# Patient Record
Sex: FEMALE | Race: WHITE | NOT HISPANIC OR LATINO | Employment: PART TIME | URBAN - METROPOLITAN AREA
[De-identification: names, ages, dates, MRNs, and addresses within clinical notes are randomized per-mention and may not be internally consistent; named-entity substitution may affect disease eponyms.]

---

## 2017-01-27 ENCOUNTER — GENERIC CONVERSION - ENCOUNTER (OUTPATIENT)
Dept: OTHER | Facility: OTHER | Age: 27
End: 2017-01-27

## 2017-03-09 ENCOUNTER — TRANSCRIBE ORDERS (OUTPATIENT)
Dept: LAB | Facility: CLINIC | Age: 27
End: 2017-03-09

## 2017-03-09 ENCOUNTER — LAB (OUTPATIENT)
Dept: LAB | Facility: CLINIC | Age: 27
End: 2017-03-09
Payer: COMMERCIAL

## 2017-03-09 DIAGNOSIS — G40.219 SEIZURE DISORDER, TEMPORAL LOBE, INTRACTABLE (HCC): ICD-10-CM

## 2017-03-09 DIAGNOSIS — G40.219 SEIZURE DISORDER, TEMPORAL LOBE, INTRACTABLE (HCC): Primary | ICD-10-CM

## 2017-03-09 PROCEDURE — 80175 DRUG SCREEN QUAN LAMOTRIGINE: CPT

## 2017-03-09 PROCEDURE — 36415 COLL VENOUS BLD VENIPUNCTURE: CPT

## 2017-03-09 PROCEDURE — 80203 DRUG SCREEN QUANT ZONISAMIDE: CPT

## 2017-03-12 LAB — LAMOTRIGINE SERPL-MCNC: 6.5 UG/ML (ref 2–20)

## 2017-03-13 LAB — ZONISAMIDE SERPL-MCNC: 6.3 UG/ML (ref 10–40)

## 2017-06-23 ENCOUNTER — ALLSCRIPTS OFFICE VISIT (OUTPATIENT)
Dept: OTHER | Facility: OTHER | Age: 27
End: 2017-06-23

## 2017-06-23 DIAGNOSIS — J02.9 ACUTE PHARYNGITIS: ICD-10-CM

## 2017-06-23 DIAGNOSIS — K13.79 OTHER LESIONS OF ORAL MUCOSA: ICD-10-CM

## 2017-06-23 DIAGNOSIS — J06.9 ACUTE UPPER RESPIRATORY INFECTION: ICD-10-CM

## 2017-06-23 LAB — S PYO AG THROAT QL: NEGATIVE

## 2017-06-24 ENCOUNTER — LAB CONVERSION - ENCOUNTER (OUTPATIENT)
Dept: OTHER | Facility: OTHER | Age: 27
End: 2017-06-24

## 2017-06-24 LAB — QUESTION/PROBLEM (HISTORICAL): NORMAL

## 2017-06-26 ENCOUNTER — GENERIC CONVERSION - ENCOUNTER (OUTPATIENT)
Dept: OTHER | Facility: OTHER | Age: 27
End: 2017-06-26

## 2017-06-28 ENCOUNTER — LAB CONVERSION - ENCOUNTER (OUTPATIENT)
Dept: OTHER | Facility: OTHER | Age: 27
End: 2017-06-28

## 2017-06-28 LAB
CONTACT: (HISTORICAL): NORMAL
CULTURE RESULT (HISTORICAL): NORMAL
TEST INFORMATION (HISTORICAL): NORMAL
TEST NAME (HISTORICAL): NORMAL

## 2017-06-29 ENCOUNTER — GENERIC CONVERSION - ENCOUNTER (OUTPATIENT)
Dept: OTHER | Facility: OTHER | Age: 27
End: 2017-06-29

## 2017-08-16 ENCOUNTER — GENERIC CONVERSION - ENCOUNTER (OUTPATIENT)
Dept: OTHER | Facility: OTHER | Age: 27
End: 2017-08-16

## 2017-09-12 ENCOUNTER — APPOINTMENT (EMERGENCY)
Dept: RADIOLOGY | Facility: HOSPITAL | Age: 27
End: 2017-09-12
Payer: COMMERCIAL

## 2017-09-12 ENCOUNTER — LAB (OUTPATIENT)
Dept: LAB | Facility: CLINIC | Age: 27
End: 2017-09-12
Payer: COMMERCIAL

## 2017-09-12 ENCOUNTER — TRANSCRIBE ORDERS (OUTPATIENT)
Dept: LAB | Facility: CLINIC | Age: 27
End: 2017-09-12

## 2017-09-12 ENCOUNTER — ALLSCRIPTS OFFICE VISIT (OUTPATIENT)
Dept: OTHER | Facility: OTHER | Age: 27
End: 2017-09-12

## 2017-09-12 ENCOUNTER — HOSPITAL ENCOUNTER (EMERGENCY)
Facility: HOSPITAL | Age: 27
Discharge: HOME | End: 2017-09-12
Attending: EMERGENCY MEDICINE
Payer: COMMERCIAL

## 2017-09-12 VITALS
RESPIRATION RATE: 18 BRPM | DIASTOLIC BLOOD PRESSURE: 62 MMHG | TEMPERATURE: 99.9 F | SYSTOLIC BLOOD PRESSURE: 118 MMHG | HEIGHT: 63 IN | HEART RATE: 90 BPM | BODY MASS INDEX: 24.8 KG/M2 | OXYGEN SATURATION: 100 % | WEIGHT: 140 LBS

## 2017-09-12 DIAGNOSIS — Z98.890 HISTORY OF BRAIN SURGERY: ICD-10-CM

## 2017-09-12 DIAGNOSIS — G40.919 BREAKTHROUGH SEIZURE (HCC): Primary | ICD-10-CM

## 2017-09-12 DIAGNOSIS — G40.209 CRYPTOGENIC PARTIAL COMPLEX EPILEPSY (HCC): Primary | ICD-10-CM

## 2017-09-12 DIAGNOSIS — R19.7 DIARRHEA: ICD-10-CM

## 2017-09-12 DIAGNOSIS — K13.79 OTHER LESIONS OF ORAL MUCOSA: ICD-10-CM

## 2017-09-12 DIAGNOSIS — E61.1 IRON DEFICIENCY: ICD-10-CM

## 2017-09-12 LAB
ALBUMIN SERPL BCP-MCNC: 2.7 G/DL (ref 3.5–5)
ALP SERPL-CCNC: 82 U/L (ref 46–116)
ALT SERPL W P-5'-P-CCNC: 12 U/L (ref 12–78)
ANION GAP SERPL CALCULATED.3IONS-SCNC: 6 MMOL/L (ref 4–13)
AST SERPL W P-5'-P-CCNC: 7 U/L (ref 5–45)
ATRIAL RATE: 93 BPM
BACTERIA UR QL AUTO: ABNORMAL /HPF
BILIRUB SERPL-MCNC: 0.13 MG/DL (ref 0.2–1)
BILIRUB UR QL STRIP: NEGATIVE
BUN SERPL-MCNC: 17 MG/DL (ref 5–25)
CALCIUM SERPL-MCNC: 8.4 MG/DL (ref 8.3–10.1)
CHLORIDE SERPL-SCNC: 105 MMOL/L (ref 100–108)
CLARITY UR: CLEAR
CO2 SERPL-SCNC: 24 MMOL/L (ref 21–32)
COLOR UR: YELLOW
COLOR, POC: NORMAL
CREAT SERPL-MCNC: 0.88 MG/DL (ref 0.6–1.3)
ERYTHROCYTE [DISTWIDTH] IN BLOOD BY AUTOMATED COUNT: 15.2 % (ref 11.6–15.1)
EXT PREG TEST URINE: NEGATIVE
GFR SERPL CREATININE-BSD FRML MDRD: 92 ML/MIN/1.73SQ M
GLUCOSE P FAST SERPL-MCNC: 88 MG/DL (ref 65–99)
GLUCOSE UR STRIP-MCNC: NEGATIVE MG/DL
HCT VFR BLD AUTO: 36.5 % (ref 34.8–46.1)
HGB BLD-MCNC: 11.2 G/DL (ref 11.5–15.4)
HGB UR QL STRIP.AUTO: ABNORMAL
HYALINE CASTS #/AREA URNS LPF: ABNORMAL /LPF
IRON SERPL-MCNC: 13 UG/DL (ref 50–170)
KETONES UR STRIP-MCNC: NEGATIVE MG/DL
LEUKOCYTE ESTERASE UR QL STRIP: NEGATIVE
MCH RBC QN AUTO: 24.8 PG (ref 26.8–34.3)
MCHC RBC AUTO-ENTMCNC: 30.7 G/DL (ref 31.4–37.4)
MCV RBC AUTO: 81 FL (ref 82–98)
NITRITE UR QL STRIP: NEGATIVE
NON-SQ EPI CELLS URNS QL MICRO: ABNORMAL /HPF
P AXIS: 72 DEGREES
PH UR STRIP.AUTO: 5.5 [PH] (ref 4.5–8)
PLATELET # BLD AUTO: 309 THOUSANDS/UL (ref 149–390)
PMV BLD AUTO: 8.1 FL (ref 8.9–12.7)
POTASSIUM SERPL-SCNC: 3.9 MMOL/L (ref 3.5–5.3)
PR INTERVAL: 154 MS
PROT SERPL-MCNC: 7.1 G/DL (ref 6.4–8.2)
PROT UR STRIP-MCNC: ABNORMAL MG/DL
QRS AXIS: 63 DEGREES
QRSD INTERVAL: 88 MS
QT INTERVAL: 332 MS
QTC INTERVAL: 412 MS
RBC # BLD AUTO: 4.52 MILLION/UL (ref 3.81–5.12)
RBC #/AREA URNS AUTO: ABNORMAL /HPF
SODIUM SERPL-SCNC: 135 MMOL/L (ref 136–145)
SP GR UR STRIP.AUTO: 1.02 (ref 1–1.03)
T WAVE AXIS: 44 DEGREES
TSH SERPL DL<=0.05 MIU/L-ACNC: 1.41 UIU/ML (ref 0.36–3.74)
UROBILINOGEN UR QL STRIP.AUTO: 0.2 E.U./DL
VENTRICULAR RATE: 93 BPM
WBC # BLD AUTO: 9.67 THOUSAND/UL (ref 4.31–10.16)
WBC #/AREA URNS AUTO: ABNORMAL /HPF

## 2017-09-12 PROCEDURE — 82784 ASSAY IGA/IGD/IGG/IGM EACH: CPT

## 2017-09-12 PROCEDURE — 71020 HB CHEST X-RAY 2VW FRONTAL&LATL: CPT

## 2017-09-12 PROCEDURE — 85027 COMPLETE CBC AUTOMATED: CPT

## 2017-09-12 PROCEDURE — 86003 ALLG SPEC IGE CRUDE XTRC EA: CPT

## 2017-09-12 PROCEDURE — 81001 URINALYSIS AUTO W/SCOPE: CPT

## 2017-09-12 PROCEDURE — 93005 ELECTROCARDIOGRAM TRACING: CPT

## 2017-09-12 PROCEDURE — 80203 DRUG SCREEN QUANT ZONISAMIDE: CPT

## 2017-09-12 PROCEDURE — 80175 DRUG SCREEN QUAN LAMOTRIGINE: CPT

## 2017-09-12 PROCEDURE — 99285 EMERGENCY DEPT VISIT HI MDM: CPT

## 2017-09-12 PROCEDURE — 86255 FLUORESCENT ANTIBODY SCREEN: CPT

## 2017-09-12 PROCEDURE — 83516 IMMUNOASSAY NONANTIBODY: CPT

## 2017-09-12 PROCEDURE — 81025 URINE PREGNANCY TEST: CPT | Performed by: EMERGENCY MEDICINE

## 2017-09-12 PROCEDURE — 70450 CT HEAD/BRAIN W/O DYE: CPT

## 2017-09-12 PROCEDURE — 82785 ASSAY OF IGE: CPT

## 2017-09-12 PROCEDURE — 84443 ASSAY THYROID STIM HORMONE: CPT

## 2017-09-12 PROCEDURE — 80053 COMPREHEN METABOLIC PANEL: CPT

## 2017-09-12 PROCEDURE — 83540 ASSAY OF IRON: CPT

## 2017-09-12 PROCEDURE — 81002 URINALYSIS NONAUTO W/O SCOPE: CPT | Performed by: EMERGENCY MEDICINE

## 2017-09-12 PROCEDURE — 36415 COLL VENOUS BLD VENIPUNCTURE: CPT

## 2017-09-12 PROCEDURE — 87086 URINE CULTURE/COLONY COUNT: CPT

## 2017-09-12 RX ORDER — LEVOCETIRIZINE DIHYDROCHLORIDE 5 MG/1
5 TABLET, FILM COATED ORAL EVERY EVENING
Status: ON HOLD | COMMUNITY
End: 2018-10-10

## 2017-09-12 RX ORDER — ZONISAMIDE 100 MG/1
50 CAPSULE ORAL 2 TIMES DAILY
Status: ON HOLD | COMMUNITY
End: 2018-10-10

## 2017-09-12 RX ORDER — LAMOTRIGINE 200 MG/1
100 TABLET ORAL 2 TIMES DAILY
COMMUNITY

## 2017-09-13 ENCOUNTER — GENERIC CONVERSION - ENCOUNTER (OUTPATIENT)
Dept: OTHER | Facility: OTHER | Age: 27
End: 2017-09-13

## 2017-09-13 LAB
A-LACTALB IGE QN: <0.1 KAU/I
ALLERGEN COMMENT: ABNORMAL
ALMOND IGE QN: <0.1 KUA/I
B-LACTOGLOB IGE QN: <0.1 KAU/I
CASEIN IGE QN: <0.1 KAU/I
CASHEW NUT IGE QN: <0.1 KUA/I
CODFISH IGE QN: <0.1 KUA/I
EGG WHITE IGE QN: <0.1 KUA/I
ENDOMYSIUM IGA SER QL: NEGATIVE
GLIADIN PEPTIDE IGA SER-ACNC: 6 UNITS (ref 0–19)
GLIADIN PEPTIDE IGG SER-ACNC: 2 UNITS (ref 0–19)
GLUTEN IGE QN: 0.22 KUA/I
HAZELNUT IGE QN: <0.1 KUA/L
IGA SERPL-MCNC: 184 MG/DL (ref 87–352)
MILK IGE QN: 0.12 KUA/I
PEANUT IGE QN: <0.1 KUA/I
SALMON IGE QN: <0.1 KUA/I
SCALLOP IGE QN: 0.37 KUA/L
SESAME SEED IGE QN: 0.41 KUA/I
SHRIMP IGE QN: <0.1 KUA/L
SOYBEAN IGE QN: <0.1 KUA/I
TOTAL IGE SMQN RAST: 601 KU/L (ref 0–113)
TTG IGA SER-ACNC: <2 U/ML (ref 0–3)
TTG IGG SER-ACNC: 2 U/ML (ref 0–5)
TUNA IGE QN: <0.1 KUA/I
WALNUT IGE QN: <0.1 KUA/I
WHEAT IGE QN: 1.06 KUA/I

## 2017-09-14 ENCOUNTER — GENERIC CONVERSION - ENCOUNTER (OUTPATIENT)
Dept: OTHER | Facility: OTHER | Age: 27
End: 2017-09-14

## 2017-09-14 LAB
BACTERIA UR CULT: NORMAL
LAMOTRIGINE SERPL-MCNC: 5.3 UG/ML (ref 2–20)
ZONISAMIDE SERPL-MCNC: 18 UG/ML (ref 10–40)

## 2017-10-19 ENCOUNTER — ALLSCRIPTS OFFICE VISIT (OUTPATIENT)
Dept: OTHER | Facility: OTHER | Age: 27
End: 2017-10-19

## 2017-10-20 NOTE — CONSULTS
Assessment  1  Change in bowel habits (787 99) (R19 4)   2  Diarrhea (787 91) (R19 7)   3  Iron deficiency anemia (280 9) (D50 9)   4  Mouth sores (528 9) (K13 79)   5  Recent weight loss (783 21) (R63 4)   6  Trouble swallowing (787 20) (R13 10)    Plan  Change in bowel habits, Diarrhea, Iron deficiency anemia, Mouth sores, Recent weight  loss, Trouble swallowing    · Pantoprazole Sodium 40 MG Oral Tablet Delayed Release; take 1 tablet 30  minutes before breakfast daily   Rx By: [employer]; Dispense: 30 Days ; #:30 Tablet Delayed Release; Refill: 3;For: Change in bowel habits, Diarrhea, Iron deficiency anemia, Mouth sores, Recent weight loss, Trouble swallowing; ANA = N; Verified Transmission to [address] [place] [address]; Last Updated By: System, [employer]; 10/19/2017 9:18:39 AM   · Suprep Bowel Prep Kit 17 5-3 13-1 6 GM/180ML Oral Solution; USE AS DIRECTED   Rx By: [employer]; Dispense: 0 Days ; #:1 X 177 ML Bottle (2 Bottles); Refill: 0;For: Change in bowel habits, Diarrhea, Iron deficiency anemia, Mouth sores, Recent weight loss, Trouble swallowing; ANA = N; Verified Transmission to [address] [place] [address]; Last Updated By: System, [employer]; 10/19/2017 9:18:39 AM   · COLONOSCOPY (GI, SURG); Status:Hold For - Scheduling; Requested SZR:[id number];    Perform:[facility]; Due:19Oct2018; Ordered; For:Change in bowel habits, Diarrhea, Iron deficiency anemia, Mouth sores, Recent weight loss, Trouble swallowing; Ordered By:Jesus Gillespie;  Iron deficiency anemia, Mouth sores, Recent weight loss, Trouble swallowing    · EGD; Status:Hold For - Scheduling; Requested QIJ:[id number];    Perform:[facility]; TER:[id number];ROGKNST; For:Iron deficiency anemia, Mouth sores, Recent weight loss, Trouble swallowing; Ordered By:Jesus Gillespie;    Discussion/Summary  Discussion Summary:   A 53-year-old female with a history of a cerebral palsy, seizure disorder status post temporal lobectomy several years ago with about 6 to 8 months of diarrhea and weight loss, more recently with oral sores, odynophagia and dysphagia  Oral sores/odynophagia/dysphagia: Differential including reflux, infectious esophagitis, extra luminal symptoms of Crohn's, allergiesempiric treatment with PPI therapywill proceed with an upper endoscopy to evaluate for the above  Diarrhea and weight loss: We, TSH, celiac panel were normalwill proceed with colonoscopy at the same time as upper endoscopy inalso in the differential  discussed with her and her mother the risks of the procedures including bleeding, surgery, perforation, missed polyp detection rate  Chief Complaint  Chief Complaint Free Text Note Form: Evaluation for multiple GI complaints      History of Present Illness  HPI: As you know this is a pleasant 82-year-old female with a history of epilepsy, cerebral palsy, status post temporal lobectomy several years ago at [place], whose had ongoing seizures but since February of this year she has had increasing GI symptoms  At that time she began to develop diarrheal symptoms which had been fairly persistent on a daily basis with anywhere from 1 to 3 loose bowel movements a day, sometimes somewhat more formed  She had some urgency and which she describes as a gurgling in her abdomen but no significant abdominal pain  Over the last 2 to 3 months she has began to develop oral sores, some fairly frequently having canker sores and now pain and discomfort when swallowing with occasional dysphagia as well  She saw ENT and was suggested to reflux, she tried over-the-counter PPI therapy without any significant improvement  denies any nausea, vomiting, typical heartburn symptoms denies any abdominal pain  She has also unintentionally lost a significant amount of weight, almost 30 lb since 2014  denies any significant NSAID use   She had an extensive laboratory evaluation which was relatively unremarkable, normal is some iron deficiency and microcytic anemia, normal celiac panel  Her allergy panel did suggest several allergies including wheat, eggs  is no family history of underlying inflammatory bowel disease or GI malignancies  Patient denies any tobacco or alcohol  She works in retail   had a seizure episode while waiting for the bus at that time  Review of Systems  Complete-Female GI Adult: Other Symptoms: The remainder of the ten ROS was negative  Active Problems  1  Cerebral palsy (343 9) (G80 9)   2  Diarrhea (787 91) (R19 7)   3  Epilepsy (345 90)   4  Iron deficiency anemia (280 9) (D50 9)   5  Irregular periods (626 4) (N92 6)   6  Mouth sores (528 9) (K13 79)   7  Need for influenza vaccination (V04 81) (Z23)   8  Pre-op evaluation (V72 84) (O43 253)    Past Medical History  1  History of A Premature Birth   2  Acute otitis media (382 9) (H66 90)   3  History of Intracerebral hemorrhage (431) (I61 9)  Active Problems And Past Medical History Reviewed: The active problems and past medical history were reviewed and updated today  Surgical History  1  History of Install Vagal Nerve Neurostimulator By Incision   2  History of Primary Repair Of Ruptured Achilles Tendon   3  History of Tonsillectomy With Adenoidectomy  Surgical History Reviewed: The surgical history was reviewed and updated today  Family History  Family History Reviewed: The family history was reviewed and updated today  Social History   · Never a smoker  Social History Reviewed: The social history was reviewed and updated today  Current Meds   1  Ferrous Sulfate 325 (65 Fe) MG Oral Tablet; TAKE 1 TABLET TWICE DAILY WITH MEALS; Therapy: 20Jxt4232 to (Evaluate:12Jan2018)  Requested for: 08Iex5245; Last   Rx:[id number] Ordered   2  LaMICtal 200 MG Oral Tablet; TAKE 0 5 TABLET Twice daily; Therapy: 89EYR3166 to Recorded   3  Zonisamide 100 MG Oral Capsule; TAKE 1 CAPSULE EVERY 12 HOURS DAILY;    Therapy: 95SPH5702 to Recorded  Medication List Reviewed: The medication list was reviewed and updated today  Allergies  1   No Known Drug Allergies    Vitals  Vital Signs    Recorded: 03EJM9021 08:53AM   Temperature 98 9 F   Heart Rate 90   Respiration 16   Systolic 316   Diastolic 68   Height 5 ft 3 in   Weight 137 lb 8 oz   BMI Calculated 24 36   BSA Calculated 1 65   O2 Saturation 95     Physical Exam  Gen: wn/wd, NAD  HEENT: anicteric, MMM, no cervical LAD  CVS: RRR, no m/r/g  CHEST: CTA b/l  ABD: +BS, soft, NT,ND, no hepatosplenomegaly  EXT: no c/c/e  NEURO: aaox3, some contractures and spasticity  SKIN: NO rashes         Future Appointments    Date/Time Provider Specialty Site   09/14/2018 09:00 AM Henok Bowman, DO Family Medicine [address] [facility]     Signatures   Electronically signed by : TIMO Prasad ; Oct 19 2017  9:22AM EST                       (Author)

## 2017-10-25 ENCOUNTER — ANESTHESIA EVENT (OUTPATIENT)
Dept: PERIOP | Facility: AMBULARY SURGERY CENTER | Age: 27
End: 2017-10-25
Payer: COMMERCIAL

## 2017-11-16 ENCOUNTER — HOSPITAL ENCOUNTER (OUTPATIENT)
Facility: AMBULARY SURGERY CENTER | Age: 27
Setting detail: OUTPATIENT SURGERY
Discharge: HOME | End: 2017-11-16
Attending: INTERNAL MEDICINE | Admitting: INTERNAL MEDICINE
Payer: COMMERCIAL

## 2017-11-16 ENCOUNTER — ANESTHESIA (OUTPATIENT)
Dept: PERIOP | Facility: AMBULARY SURGERY CENTER | Age: 27
End: 2017-11-16
Payer: COMMERCIAL

## 2017-11-16 ENCOUNTER — GENERIC CONVERSION - ENCOUNTER (OUTPATIENT)
Dept: OTHER | Facility: OTHER | Age: 27
End: 2017-11-16

## 2017-11-16 VITALS
HEIGHT: 63 IN | RESPIRATION RATE: 20 BRPM | OXYGEN SATURATION: 99 % | BODY MASS INDEX: 24.63 KG/M2 | TEMPERATURE: 97.6 F | HEART RATE: 58 BPM | DIASTOLIC BLOOD PRESSURE: 56 MMHG | SYSTOLIC BLOOD PRESSURE: 97 MMHG | WEIGHT: 139 LBS

## 2017-11-16 DIAGNOSIS — K50.80 CROHN'S DISEASE OF BOTH SMALL AND LARGE INTESTINE WITHOUT COMPLICATION (HCC): ICD-10-CM

## 2017-11-16 DIAGNOSIS — K13.79 MOUTH SORES: ICD-10-CM

## 2017-11-16 DIAGNOSIS — R63.4 RECENT WEIGHT LOSS: ICD-10-CM

## 2017-11-16 DIAGNOSIS — R19.4 CHANGE IN BOWEL HABITS: ICD-10-CM

## 2017-11-16 DIAGNOSIS — R19.7 DIARRHEA: ICD-10-CM

## 2017-11-16 DIAGNOSIS — R13.10 TROUBLE SWALLOWING: ICD-10-CM

## 2017-11-16 DIAGNOSIS — D50.9 IRON DEFICIENCY ANEMIA: ICD-10-CM

## 2017-11-16 LAB — EXT PREGNANCY TEST URINE: NEGATIVE

## 2017-11-16 PROCEDURE — 88342 IMHCHEM/IMCYTCHM 1ST ANTB: CPT | Performed by: INTERNAL MEDICINE

## 2017-11-16 PROCEDURE — 81025 URINE PREGNANCY TEST: CPT | Performed by: ANESTHESIOLOGY

## 2017-11-16 PROCEDURE — 88305 TISSUE EXAM BY PATHOLOGIST: CPT | Performed by: INTERNAL MEDICINE

## 2017-11-16 RX ORDER — SODIUM CHLORIDE 9 MG/ML
INJECTION, SOLUTION INTRAVENOUS CONTINUOUS PRN
Status: DISCONTINUED | OUTPATIENT
Start: 2017-11-16 | End: 2017-11-16 | Stop reason: SURG

## 2017-11-16 RX ORDER — PROPOFOL 10 MG/ML
INJECTION, EMULSION INTRAVENOUS CONTINUOUS PRN
Status: DISCONTINUED | OUTPATIENT
Start: 2017-11-16 | End: 2017-11-16 | Stop reason: SURG

## 2017-11-16 RX ORDER — SODIUM CHLORIDE, SODIUM LACTATE, POTASSIUM CHLORIDE, CALCIUM CHLORIDE 600; 310; 30; 20 MG/100ML; MG/100ML; MG/100ML; MG/100ML
125 INJECTION, SOLUTION INTRAVENOUS CONTINUOUS
Status: DISCONTINUED | OUTPATIENT
Start: 2017-11-16 | End: 2017-11-16 | Stop reason: HOSPADM

## 2017-11-16 RX ORDER — PROPOFOL 10 MG/ML
INJECTION, EMULSION INTRAVENOUS AS NEEDED
Status: DISCONTINUED | OUTPATIENT
Start: 2017-11-16 | End: 2017-11-16 | Stop reason: SURG

## 2017-11-16 RX ADMIN — PROPOFOL 100 MCG/KG/MIN: 10 INJECTION, EMULSION INTRAVENOUS at 09:21

## 2017-11-16 RX ADMIN — PROPOFOL 150 MG: 10 INJECTION, EMULSION INTRAVENOUS at 09:22

## 2017-11-16 RX ADMIN — SODIUM CHLORIDE: 0.9 INJECTION, SOLUTION INTRAVENOUS at 09:14

## 2017-11-16 NOTE — OP NOTE
COLONOSCOPY    PROCEDURE: Colonoscopy/ Biopsy    INDICATIONS: Abdominal Pain, Chronic, Diarrhea    POST-OP DIAGNOSIS: See the impression below    SEDATION: Monitored anesthesia care, check anesthesia records    PHYSICAL EXAM:    /54   Pulse 61 Comment: nsr  Temp 98 2 °F (36 8 °C) (Temporal)   Resp 16   Ht 5' 3" (1 6 m)   Wt 63 kg (139 lb)   LMP 10/02/2017   SpO2 100%   BMI 24 62 kg/m²   Body mass index is 24 62 kg/m²  General: NAD  Heart: S1 & S2 normal, RRR  Lungs: CTA, No rales or rhonchi  Abdomen: Soft, nontender, nondistended, good bowel sounds    CONSENT:  Informed consent was obtained for the procedure, including sedation after explaining the risks and benefits of the procedure  Risks including but not limited to bleeding, perforation, infection, aspiration were discussed in detail  Also explained about less than 100%$ sensitivity with the exam and other alternatives  PREPARATION:   EKG tracing, pulse oximetry, blood pressure were monitored throughout the procedure  Patient was identified by myself both verbally and by visual inspection of ID band  DESCRIPTION:   Patient was placed in the left lateral decubitus position and was sedated with the above medication  Digital rectal examination was performed  The colonoscope was introduced in to the anal canal and advanced up to terminal ileum  A careful inspection was made as the colonoscope was withdrawn, including a retroflexed view of the rectum; findings and interventions are described below  Appropriate photodocumentation was obtained  The quality of the colonic preparation was good      FINDINGS:    Several deep ileal ulcers, after approximately 15 cm could not advance the scope beyond this area possible mild stenosis  Multiple biopsies to were taken from the ulcerated areas several these ulcers were fairly large with stellate appearance  Mild erythematous changes increasing in the ascending colon biopsies taken for inflammatory bowel disease  Several scattered ulcers throughout the colon, appeared to be aphthous ulcers biopsies were taken  Otherwise normal examination through the transverse, descending, sigmoid and rectum with mild internal hemorrhoids on retroflexion         IMPRESSIONS:      Inflamed and ulcerated terminal ileum with several deep crated ulcers with stellate appearance, possible mild stenosis more proximally within the ileum, biopsies taken  Scattered aphthous ulcers through the transverse colon biopsy  Mild erythematous changes and increased vascularity in ascending colon, biopsied  Overall findings are suspicious for underlying inflammatory bowel disease, most consistent with Crohn's with moderate endoscopic findings    RECOMMENDATIONS:    Discharge home  Resume regular diet  Resume home medications  Follow up biopsy results  Recommend checking sed rate, CRP, fecal calprotectin an MR enterography  Follow up in the office  Call with any abdominal pain, bleeding, fevers    COMPLICATIONS:  None; patient tolerated the procedure well      DISPOSITION: PACU           CONDITION: Stable

## 2017-11-16 NOTE — H&P
History and Physical -  [facility]  Man Ma 24 y o  female MRN: [id number]    HPI: Man Ma is a 24y o  year old female who presents with dysphagia/odynophagia, diarrhea and weight loss  Review of Systems    Historical Information   Past Medical History:   Diagnosis Date    [facility]erebral [facility]y ([facility] [address] [facility]     Seizures ([facility] [address] )     last seizure sept 2017     Past Surgical History:   Procedure Laterality Date    BRAIN SURGERY      EYE SURGERY      TONSILLECTOMY       Social History   History   Alcohol Use No     History   Drug Use No     History   Smoking Status    Never Smoker   Smokeless Tobacco    Never Used     History reviewed  No pertinent family history  Meds/Allergies     Prescriptions Prior to Admission   Medication    lamoTRIgine (LaMICtal) 200 MG tablet    levocetirizine (XYZAL) 5 MG tablet    zonisamide (ZONEGRAN) 100 mg capsule       No Known Allergies    Objective     Blood pressure 114/54, pulse 61, temperature 98 2 °F (36 8 °C), temperature source Temporal, resp  rate 16, height 5' 3" (1 6 m), weight 63 kg (139 lb), last menstrual period 10/02/2017, SpO2 100 %  PHYSICAL EXAM    Gen: NAD  CV: RRR  CHEST: Clear  ABD: soft, NT/ND  EXT: no edema  Neuro: AAO      ASSESSMENT/PLAN:  This is a 24y o  year old female here for dysphagia/odynophagia, diarrhea and weight loss       PLAN:   Procedure: egd/colonoscopy

## 2017-11-16 NOTE — OP NOTE
ESOPHAGOGASTRODUODENOSCOPY    PROCEDURE: EGD/ Biopsy    INDICATIONS: Abdominal pain, Epigastric, Dysphagia and Diarrhea    POST-OP DIAGNOSIS: See the impression below    SEDATION: Monitored anesthesia care, check anesthesia records    PHYSICAL EXAM:    Vitals:    11/16/17 0856   BP: 114/54   Pulse: 61   Resp: 16   Temp: 98 2 °F (36 8 °C)   SpO2: 100%    Body mass index is 24 62 kg/m²  General: NAD  Heart: S1 & S2 normal, RRR  Lungs: CTA, No rales or rhonchi  Abdomen: Soft, nontender, nondistended, good bowel sounds    CONSENT:  Informed consent was obtained for the procedure, including sedation after explaining the risks and benefits of the procedure  Risks including but not limited to bleeding, perforation, infection, aspiration were discussed in detail  Also explained about less than 100% sensitivity with the exam and other alternatives  PREPARATION:   EKG tracing, pulse oximetry, blood pressure were monitored throughout the procedure  Patient was identified by myself both verbally and by visual inspection of ID band  DESCRIPTION:   Patient was placed in the left lateral decubitus position and was sedated with the above medication  The gastroscope was introduced in to the oropharynx and the esophagus was intubated under direct visualization  Scope was passed down the esophagus up to 2nd part of the duodenum  A careful inspection was made as the gastroscope was withdrawn, including a retroflexed view of the stomach; findings and interventions are described below  FINDINGS:    #1  Esophagus and GEJ-normal appearing esophagus and normal GE junction with no Schatzki's ring  Midesophageal biopsies were taken for eosinophilic esophagitis    #2  Stomach-mild antral gastritis, biopsied for H pylori  Normal retroflexion    #3   Duodenum-normal duodenum to the 3rd and 4th portions, random biopsies taken for celiac         IMPRESSIONS:      Mild gastritis otherwise normal examination  Duodenal biopsies taken  Gastric biopsies taken  Midesophageal biopsies taken    RECOMMENDATIONS:     Discharge home  Resume regular diet  Resume home medications  Follow up biopsy results  Call with any abdominal pain, bleeding, fevers    COMPLICATIONS:  None; patient tolerated the procedure well            DISPOSITION: PACU           CONDITION: Stable

## 2017-11-16 NOTE — DISCHARGE INSTRUCTIONS
Discharge home  Resume regular diet  Resume home medications  Follow up biopsy results  Recommend checking sed rate, CRP, fecal calprotectin an MR enterography  Follow up in the office  Call with any abdominal pain, bleeding, fevers

## 2017-11-16 NOTE — ANESTHESIA POSTPROCEDURE EVALUATION
Post-Op Assessment Note      CV Status:  Stable    Mental Status:  Alert and awake    Hydration Status:  Euvolemic    PONV Controlled:  Controlled    Airway Patency:  Patent    Post Op Vitals Reviewed: Yes          Staff: Anesthesiologist           BP   97/51   Temp   97 6   Pulse  58   Resp 16   SpO2 100

## 2017-11-16 NOTE — ANESTHESIA PREPROCEDURE EVALUATION
Review of Systems/Medical History  Patient summary reviewed  Chart reviewed  No history of anesthetic complications     Cardiovascular  EKG reviewed, Negative cardio ROS    Pulmonary  Negative pulmonary ROS ,        GI/Hepatic    Bowel prep       Negative  ROS        Endo/Other  Negative endo/other ROS      GYN  Not currently pregnant ,          Hematology  Negative hematology ROS      Musculoskeletal  Negative musculoskeletal ROS        Neurology  Seizures well controlled,  Neuromuscular disease , cerebral palsy,   Comment: S/P sz surgery, has implant  Last isolated sz was 09/17 Psychology   Negative psychology ROS            Physical Exam    Airway    Mallampati score: I  TM Distance: >3 FB  Neck ROM: full     Dental   No notable dental hx     Cardiovascular  Comment: Negative ROS,     Pulmonary      Other Findings        Anesthesia Plan  ASA Score- 3       Anesthesia Type- IV sedation with anesthesia with ASA Monitors  Additional Monitors:   Airway Plan:     Comment: ASA-3 incompletely controlled SZ and CP   Induction- intravenous  Informed Consent- Anesthetic plan and risks discussed with patient

## 2017-11-21 ENCOUNTER — TRANSCRIBE ORDERS (OUTPATIENT)
Dept: ADMINISTRATIVE | Facility: HOSPITAL | Age: 27
End: 2017-11-21

## 2017-11-21 DIAGNOSIS — R19.7 DIARRHEA, UNSPECIFIED TYPE: Primary | ICD-10-CM

## 2017-12-01 ENCOUNTER — GENERIC CONVERSION - ENCOUNTER (OUTPATIENT)
Dept: OTHER | Facility: OTHER | Age: 27
End: 2017-12-01

## 2017-12-04 ENCOUNTER — GENERIC CONVERSION - ENCOUNTER (OUTPATIENT)
Dept: OTHER | Facility: OTHER | Age: 27
End: 2017-12-04

## 2017-12-08 ENCOUNTER — TRANSCRIBE ORDERS (OUTPATIENT)
Dept: ADMINISTRATIVE | Facility: HOSPITAL | Age: 27
End: 2017-12-08

## 2017-12-08 ENCOUNTER — APPOINTMENT (OUTPATIENT)
Dept: LAB | Facility: CLINIC | Age: 27
End: 2017-12-08
Payer: COMMERCIAL

## 2017-12-08 DIAGNOSIS — R19.7 DIARRHEA: ICD-10-CM

## 2017-12-08 DIAGNOSIS — K50.80 CROHN'S DISEASE OF BOTH SMALL AND LARGE INTESTINE WITHOUT COMPLICATION (HCC): Primary | ICD-10-CM

## 2017-12-08 LAB
CRP SERPL QL: 40.8 MG/L
ERYTHROCYTE [SEDIMENTATION RATE] IN BLOOD: 3 MM/HOUR (ref 0–20)

## 2017-12-08 PROCEDURE — 36415 COLL VENOUS BLD VENIPUNCTURE: CPT

## 2017-12-08 PROCEDURE — 86704 HEP B CORE ANTIBODY TOTAL: CPT

## 2017-12-08 PROCEDURE — 86140 C-REACTIVE PROTEIN: CPT

## 2017-12-08 PROCEDURE — 83993 ASSAY FOR CALPROTECTIN FECAL: CPT

## 2017-12-08 PROCEDURE — 86706 HEP B SURFACE ANTIBODY: CPT

## 2017-12-08 PROCEDURE — 85652 RBC SED RATE AUTOMATED: CPT

## 2017-12-08 PROCEDURE — 87340 HEPATITIS B SURFACE AG IA: CPT

## 2017-12-09 LAB
HBV CORE AB SER QL: NORMAL
HBV SURFACE AB SER-ACNC: 54.92 MIU/ML
HBV SURFACE AG SER QL: NORMAL

## 2017-12-11 ENCOUNTER — GENERIC CONVERSION - ENCOUNTER (OUTPATIENT)
Dept: OTHER | Facility: OTHER | Age: 27
End: 2017-12-11

## 2017-12-12 ENCOUNTER — TRANSCRIBE ORDERS (OUTPATIENT)
Dept: LAB | Facility: CLINIC | Age: 27
End: 2017-12-12

## 2017-12-12 ENCOUNTER — APPOINTMENT (OUTPATIENT)
Dept: LAB | Facility: CLINIC | Age: 27
End: 2017-12-12
Payer: COMMERCIAL

## 2017-12-12 DIAGNOSIS — R19.7 DIARRHEA, UNSPECIFIED TYPE: ICD-10-CM

## 2017-12-12 DIAGNOSIS — R19.7 DIARRHEA, UNSPECIFIED TYPE: Primary | ICD-10-CM

## 2017-12-12 PROCEDURE — 86480 TB TEST CELL IMMUN MEASURE: CPT

## 2017-12-12 PROCEDURE — 36415 COLL VENOUS BLD VENIPUNCTURE: CPT

## 2017-12-13 LAB — CALPROTECTIN STL-MCNT: 317 UG/G (ref 0–120)

## 2017-12-14 LAB
ANNOTATION COMMENT IMP: NORMAL
GAMMA INTERFERON BACKGROUND BLD IA-ACNC: 0.07 IU/ML
M TB IFN-G BLD-IMP: NEGATIVE
M TB IFN-G CD4+ BCKGRND COR BLD-ACNC: 0 IU/ML
M TB IFN-G CD4+ T-CELLS BLD-ACNC: 0.07 IU/ML
MITOGEN IGNF BLD-ACNC: 2.63 IU/ML
QUANTIFERON-TB GOLD IN TUBE: NORMAL
SERVICE CMNT-IMP: NORMAL

## 2017-12-18 ENCOUNTER — GENERIC CONVERSION - ENCOUNTER (OUTPATIENT)
Dept: OTHER | Facility: OTHER | Age: 27
End: 2017-12-18

## 2017-12-19 ENCOUNTER — HOSPITAL ENCOUNTER (OUTPATIENT)
Dept: CT IMAGING | Facility: HOSPITAL | Age: 27
Discharge: HOME | End: 2017-12-19
Attending: INTERNAL MEDICINE
Payer: COMMERCIAL

## 2017-12-19 DIAGNOSIS — K50.80 CROHN'S DISEASE OF BOTH SMALL AND LARGE INTESTINE WITHOUT COMPLICATION (HCC): ICD-10-CM

## 2017-12-19 PROCEDURE — 74177 CT ABD & PELVIS W/CONTRAST: CPT

## 2017-12-19 RX ADMIN — IOHEXOL 100 ML: 350 INJECTION, SOLUTION INTRAVENOUS at 08:49

## 2017-12-22 ENCOUNTER — GENERIC CONVERSION - ENCOUNTER (OUTPATIENT)
Dept: FAMILY MEDICINE CLINIC | Facility: CLINIC | Age: 27
End: 2017-12-22

## 2017-12-26 ENCOUNTER — GENERIC CONVERSION - ENCOUNTER (OUTPATIENT)
Dept: OTHER | Facility: OTHER | Age: 27
End: 2017-12-26

## 2017-12-28 ENCOUNTER — ALLSCRIPTS OFFICE VISIT (OUTPATIENT)
Dept: OTHER | Facility: OTHER | Age: 27
End: 2017-12-28

## 2017-12-29 NOTE — PROGRESS NOTES
Assessment   1  Crohn's disease of both small and large intestine without complication (806 8) (O45 01)   2  Iron deficiency anemia (280 9) (D50 9)   3  Recent weight loss (783 21) (R63 4)   4  Trouble swallowing (787 20) (R13 10)    Discussion/Summary   Discussion Summary:    82-year-old female with a history of a seizure disorder, now with newly diagnosed small and large intestinal Crohn's disease  Newly diagnosed small and large intestinal Crohn's disease:  Pentasa and budesonide for now  inflammatory markers including fecal calprotectin or markedly elevated  CT scan with significant small bowel inflammatory changes we will see the patient back in 3 months time, consider colonoscopy thereafter was asked to give us a call with worsening symptoms, in the event that we need to escalate her therapy  now she appears to be clinically stable  QuantiFERON hepatitis studies were negative  if she has continued or worsening symptoms despite current regimen would consider Entyvio   Dysphagia and odynophagia: some of this was due to oral ulcers, these seem to be improving treatment of underlying IBD likely due to large pills, her symptoms seem to be fairly well controlled  diet as tolerated symptoms of dysphagia or odynophagia worsen will continue consider repeat upper endoscopy  Chief Complaint   Chief Complaint Free Text Note Form: Diarrhea and difficulty swallowing      History of Present Illness   HPI: as you know this is a pleasant 82-year-old female with a history of seizure disorder, cerebral palsy, presented with diarrhea, had a colonoscopy and recent CT scan which was consistent with small bowel Crohn's disease with some scattered colonic inflammatory changes as well  She was started on budesonide and Pentasa  She reports that her diarrhea is improved  She still has soft stools of much less frequency  She is eating well, she does report occasional difficulty swallowing mostly with her large pills   Denies any significant odynophagia  Her weight has been stable  She denies any significant side effects to medications  I tried to answer all questions or concerns she may have underlying inflammatory bowel disease  She has been tolerating a regular diet  She reports that her oral sores is somewhat improved compared to when she 1st was seen in the office  Review of Systems   Complete-Female GI Adult: Other Symptoms: The remainder of the ten ROS was negative  Active Problems   1  Abdominal pain (789 00) (R10 9)   2  Blood in the stool (578 1) (K92 1)   3  Cerebral palsy (343 9) (G80 9)   4  Change in bowel habits (787 99) (R19 4)   5  Constipation (564 00) (K59 00)   6  Crohn's disease of both small and large intestine without complication (967 1) (Z08 96)   7  Diarrhea (787 91) (R19 7)   8  Epilepsy (345 90)   9  Fatigue (780 79) (R53 83)   10  Iron deficiency anemia (280 9) (D50 9)   11  Irregular periods (626 4) (N92 6)   12  Mouth sores (528 9) (K13 79)   13  Need for influenza vaccination (V04 81) (Z23)   14  Pre-op evaluation (V72 84) (Z01 818)   15  Recent weight loss (783 21) (R63 4)   16  Trouble swallowing (787 20) (R13 10)    Past Medical History   1  History of A Premature Birth   2  Acute otitis media (382 9) (H66 90)   3  History of Intracerebral hemorrhage (431) (I61 9)  Active Problems And Past Medical History Reviewed: The active problems and past medical history were reviewed and updated today  Surgical History   1  History of Install Vagal Nerve Neurostimulator By Incision   2  History of Primary Repair Of Ruptured Achilles Tendon   3  History of Tonsillectomy With Adenoidectomy  Surgical History Reviewed: The surgical history was reviewed and updated today  Family History   Mother    1  Denied: Family history of Crohn's disease without complication, unspecified     gastrointestinal tract location   2  Denied: Family history of colon cancer   3   Denied: Family history of liver disease   4  Family history of malignant neoplasm of ovary (V16 41) (Z80 41)  Father    5  Denied: Family history of Crohn's disease without complication, unspecified     gastrointestinal tract location   6  Denied: Family history of colon cancer   7  Denied: Family history of liver disease  Family History Reviewed: The family history was reviewed and updated today  Social History    · Never a smoker   · No alcohol use  Social History Reviewed: The social history was reviewed and updated today  Current Meds    1  Budesonide 3 MG Oral Capsule Delayed Release Particles; take 3 capsule daily; Therapy: 62XTC8680 to (Evaluate:29Jun2018)  Requested for: 33XKV3238; Last     Rx:36Qvg1726 Ordered   2  Ferrous Sulfate 325 (65 Fe) MG Oral Tablet; TAKE 1 TABLET TWICE DAILY WITH MEALS; Therapy: 63Vgt9646 to (Evaluate:12Jan2018)  Requested for: 86Aar5054; Last     Rx:42Htb6459 Ordered   3  LaMICtal 200 MG Oral Tablet; TAKE 0 5 TABLET Twice daily; Therapy: 17RAJ6825 to Recorded   4  Pantoprazole Sodium 40 MG Oral Tablet Delayed Release; take 1 tablet 30 minutes     before breakfast daily; Therapy: 42QBU6784 to (Evaluate:81Ymz0721)  Requested for: 19Oct2017; Last     Rx:19Oct2017 Ordered   5  Pentasa 500 MG Oral Capsule Extended Release; TAKE 4 CAPSULES TWICE DAILY; Therapy: 01DUD4566 to (Evaluate:29Jun2018)  Requested for: 85UHQ0483; Last     Rx:64Grg0801 Ordered   6  Suprep Bowel Prep Kit 17 5-3 13-1 6 GM/180ML Oral Solution; USE AS DIRECTED; Therapy: 78XUW9739 to (Last Rx:19Oct2017)  Requested for: 19Oct2017 Ordered   7  Zonisamide 100 MG Oral Capsule; TAKE 1 CAPSULE EVERY 12 HOURS DAILY; Therapy: 69TMY3296 to Recorded  Medication List Reviewed: The medication list was reviewed and updated today  Allergies   1  No Known Drug Allergies  2  Gluten   3  Milk   4   Wheat    Vitals   Vital Signs    Recorded: 30EON2920 04:45PM   Temperature 97 3 F   Heart Rate 83   Systolic 365 Diastolic 64   Weight 583 lb    BMI Calculated 25 69   BSA Calculated 1 69   O2 Saturation 98     Physical Exam     Gen: wn/wd, NAD     HEENT: anicteric, MMM, no cervical LAD     CVS: RRR, no m/r/g     CHEST: CTA b/l     ABD: +BS, soft, NT,ND, no hepatosplenomegaly     EXT: no c/c/e     NEURO: aaox3     SKIN: NO rashes             Future Appointments      Date/Time Provider Specialty Site   01/19/2018 03:20 PM TIMO Singh   Gastroenterology Adult [facility] [place]   09/14/2018 09:00 AM Efrain Hall DO [facility] [address] [facility]     Signatures    Electronically signed by : TIMO Clancy ; Dec 28 2017 11:26PM EST                       (Author)

## 2018-01-03 ENCOUNTER — GENERIC CONVERSION - ENCOUNTER (OUTPATIENT)
Dept: FAMILY MEDICINE CLINIC | Facility: CLINIC | Age: 28
End: 2018-01-03

## 2018-01-09 NOTE — RESULT NOTES
Verified Results  (Q) TEST AUTHORIZATION [id number] 12:00AM Nino Ruelas     Test Name Result Flag Reference   TEST(S) ORDERED ON$REQUISITION      STREPTOCOCCUS, GROUP A   TEST CODE: [id number]     CLIENT CONTACT: OSITO DOS SANTOS     REPORT ALWAYS MESSAGE$SIGNATURE See Below     The laboratory testing on this patient was verbally requested  or confirmed by the ordering physician or his or her authorized  representative after contact with an employee of [employer]  Federal regulations require that we maintain on file written  authorization for all laboratory testing  Accordingly we are asking  that the ordering physician or his or her authorized representative  sign a copy of this report and promptly return it to the client            Signature:____________________________________________________     (Q) STREPTOCOCCUS, GROUP A CULTURE [id number] 12:00AM Nino Ruelas     Test Name Result Flag Reference   STREPTOCOCCUS, GROUP A$CULTURE      STREPTOCOCCUS, GROUP A CULTURE         MICRO NUMBER:      [id number]    TEST STATUS:       FINAL    SPECIMEN SOURCE:   THROAT    SPECIMEN QUALITY:  ADEQUATE    RESULT:            No group A Streptococcus isolated

## 2018-01-10 DIAGNOSIS — R19.4 CHANGE IN BOWEL HABITS: ICD-10-CM

## 2018-01-10 DIAGNOSIS — K50.80 CROHN'S DISEASE OF BOTH SMALL AND LARGE INTESTINE WITHOUT COMPLICATION (HCC): ICD-10-CM

## 2018-01-12 ENCOUNTER — LAB (OUTPATIENT)
Dept: LAB | Facility: CLINIC | Age: 28
End: 2018-01-12
Payer: COMMERCIAL

## 2018-01-12 ENCOUNTER — TRANSCRIBE ORDERS (OUTPATIENT)
Dept: LAB | Facility: CLINIC | Age: 28
End: 2018-01-12

## 2018-01-12 VITALS
RESPIRATION RATE: 16 BRPM | OXYGEN SATURATION: 95 % | TEMPERATURE: 98.9 F | SYSTOLIC BLOOD PRESSURE: 112 MMHG | DIASTOLIC BLOOD PRESSURE: 68 MMHG | HEIGHT: 63 IN | HEART RATE: 90 BPM | WEIGHT: 137.5 LBS | BODY MASS INDEX: 24.36 KG/M2

## 2018-01-12 DIAGNOSIS — K50.80 CROHN'S DISEASE OF BOTH SMALL AND LARGE INTESTINE WITHOUT COMPLICATION (HCC): ICD-10-CM

## 2018-01-12 DIAGNOSIS — R19.4 CHANGE IN BOWEL HABITS: ICD-10-CM

## 2018-01-12 LAB
CRP SERPL QL: 27.8 MG/L
ERYTHROCYTE [DISTWIDTH] IN BLOOD BY AUTOMATED COUNT: 15 % (ref 11.6–15.1)
HCT VFR BLD AUTO: 37.9 % (ref 34.8–46.1)
HGB BLD-MCNC: 11.2 G/DL (ref 11.5–15.4)
MCH RBC QN AUTO: 24.9 PG (ref 26.8–34.3)
MCHC RBC AUTO-ENTMCNC: 29.6 G/DL (ref 31.4–37.4)
MCV RBC AUTO: 84 FL (ref 82–98)
PLATELET # BLD AUTO: 319 THOUSANDS/UL (ref 149–390)
PMV BLD AUTO: 7.5 FL (ref 8.9–12.7)
RBC # BLD AUTO: 4.49 MILLION/UL (ref 3.81–5.12)
WBC # BLD AUTO: 8.4 THOUSAND/UL (ref 4.31–10.16)

## 2018-01-12 PROCEDURE — 86140 C-REACTIVE PROTEIN: CPT

## 2018-01-12 PROCEDURE — 36415 COLL VENOUS BLD VENIPUNCTURE: CPT

## 2018-01-12 PROCEDURE — 85027 COMPLETE CBC AUTOMATED: CPT

## 2018-01-12 NOTE — PROGRESS NOTES
History of Present Illness  Care Coordination Encounter Information:   Type of Encounter: Telephonic   Contact: Initial Contact   Last Office Visit: 10/28/2014   Spoke to Patient   outreached patient per request of CBC post discharge from [place] on 1/9/16 for epilepsy  she states at this time she does not need a follow up with PCP  she will going back to see Neuro at [place]  Not having any issues at this time  Meds reviewed taking Lamctil and Zonsimide  Will call PCP if needed  Active Problems    1  Cerebral palsy (343 9) (G80 9)   2  Epilepsy (345 90)   3  Eustachian tube dysfunction (381 81) (H69 80)   4  Need for influenza vaccination (V04 81) (Z23)   5  Pre-op evaluation (V72 84) (Z01 818)   6  Presence of contraceptive device (V45 59) (Z30 9)    Past Medical History    1  History of A Premature Birth   2  Acute otitis media (382 9) (H66 90)   3  History of Intracerebral hemorrhage (431) (I61 9)    Surgical History    1  History of Install Vagal Nerve Neurostimulator By Incision   2  History of Primary Repair Of Ruptured Achilles Tendon   3  History of Tonsillectomy With Adenoidectomy    Family History    1  No significant family history    2  No significant family history    Social History    · Never A Smoker   · Presence of contraceptive device (V45 59) (Z30 9)    Current Meds    1  Zonisamide 100 MG Oral Capsule; TAKE 1 CAPSULE EVERY 12 HOURS DAILY; Therapy: 63BRN9523 to Recorded    2  Levocetirizine Dihydrochloride 5 MG Oral Tablet; TAKE 1 TABLET DAILY IN THE EVENING; Therapy: 11JBU7121 to () Recorded    3  LaMICtal 200 MG Oral Tablet (LamoTRIgine); TAKE 2 TABLETS DAILY; Therapy: 30FIW3011 to Recorded    Allergies    1  No Known Drug Allergies    2  No Known Environmental Allergies   3  No Known Food Allergies    End of Encounter Meds    1  Zonisamide 100 MG Oral Capsule; TAKE 1 CAPSULE EVERY 12 HOURS DAILY; Therapy: 83VDH7448 to Recorded    2   Levocetirizine Dihydrochloride 5 MG Oral Tablet; TAKE 1 TABLET DAILY IN THE EVENING; Therapy: 85KXF5147 to [phone]) Recorded    3  LaMICtal 200 MG Oral Tablet (LamoTRIgine); TAKE 2 TABLETS DAILY;    Therapy: 77SYK8787 to Recorded    Patient Care Team    Care Team Member Role Specialty Office Number   Paula Marroquin [facility]  Family Medicine [phone]     Signatures   Electronically signed by : Fito Griffin RN; Jan 18 2016  1:02PM EST                       (Author)

## 2018-01-13 VITALS
HEIGHT: 64 IN | SYSTOLIC BLOOD PRESSURE: 114 MMHG | DIASTOLIC BLOOD PRESSURE: 60 MMHG | RESPIRATION RATE: 16 BRPM | BODY MASS INDEX: 23.99 KG/M2 | WEIGHT: 140.5 LBS | HEART RATE: 84 BPM

## 2018-01-13 VITALS
SYSTOLIC BLOOD PRESSURE: 104 MMHG | DIASTOLIC BLOOD PRESSURE: 64 MMHG | HEART RATE: 64 BPM | HEIGHT: 64 IN | RESPIRATION RATE: 16 BRPM | WEIGHT: 142.4 LBS | BODY MASS INDEX: 24.31 KG/M2 | TEMPERATURE: 98.5 F

## 2018-01-15 ENCOUNTER — GENERIC CONVERSION - ENCOUNTER (OUTPATIENT)
Dept: OTHER | Facility: OTHER | Age: 28
End: 2018-01-15

## 2018-01-15 NOTE — RESULT NOTES
Discussion/Summary   normal thyroid level, negative celiac disease  her iron level is slightly low  i will send the iron pills to the pharmacy     - Dr Rajesh Jaquez      Verified Results  (1) TSH WITH FT4 REFLEX 12Sep2017 10:17AM Karla Muñoz     Test Name Result Flag Reference   TSH 1 410 uIU/mL  0 358-3 740   Patients undergoing fluorescein dye angiography may retain small amounts of fluorescein in the body for 48-72 hours post procedure  Samples containing fluorescein can produce falsely depressed TSH values  If the patient had this procedure,a specimen should be resubmitted post fluorescein clearance  The recommended reference ranges for TSH during pregnancy are as follows:  First trimester 0 1 to 2 5 uIU/mL  Second trimester  0 2 to 3 0 uIU/mL  Third trimester 0 3 to 3 0 uIU/m     (1) CELIAC DISEASE AB PROFILE 12Sep2017 10:17AM Toni Efrain Standard Order Number: [id number]     Test Name Result Flag Reference   tTG IGG 2 U/mL  0 - 5   Negative        0 - 5                                Weak Positive   6 - 9                                Positive           >9   tTG IGA <2 U/mL  0 - 3   Negative        0 -  3                                Weak Positive   4 - 10                                Positive           >10   Tissue Transglutaminase (tTG) has been identified   as the endomysial antigen  Studies have demonstr-   ated that endomysial IgA antibodies have over 99%   specificity for gluten sensitive enteropathy     GLIADA 6 units  0 - 19   Negative                   0 - 19                     Weak Positive             20 - 30                     Moderate to Strong Positive   >30   GLIADG 2 units  0 - 19   Negative                   0 - 19                     Weak Positive             20 - 30                     Moderate to Strong Positive   >30   ENDOMYSIAL AB IGA Negative  Negative   Performed at:  [address] [place] [address]  [id number]  : Mele Angelo MD, Phone:  [phone]    mg/dL  87 - 352     (1) CBC/ PLT (NO DIFF) 04Vvl9301 10:17AM Karla Muñoz     Test Name Result Flag Reference   HEMATOCRIT 36 5 %  34 8-46 1   HEMOGLOBIN 11 2 g/dL L 11 5-15 4   MCHC 30 7 g/dL L 31 4-37 4   MCH 24 8 pg L 26 8-34 3   MCV 81 fL L 82-98   PLATELET COUNT 851 Thousands/uL  149-390   RBC COUNT 4 52 Million/uL  3 81-5 12   RDW 15 2 % H 11 6-15 1   WBC COUNT 9 67 Thousand/uL  4 31-10 16   MPV 8 1 fL L 8 9-12 7     (1) IRON [id number] 10:17AM Karla Muñoz     Test Name Result Flag Reference   IRON 13 ug/dL L    Patients treated with metal-binding drugs (ie  Deferoxamine) may have depressed iron values         Plan  Iron deficiency anemia    · Ferrous Sulfate 325 (65 Fe) MG Oral Tablet; TAKE 1 TABLET TWICE DAILY WITH  MEALS    Signatures   Electronically signed by : Tatiana Barrera MD; Sep 14 2017 10:41AM EST                       (Author)

## 2018-01-16 NOTE — RESULT NOTES
Verified Results  TEST IN QUESTIONLoreta Palm ORDER [id number] 12:00AM [place], [address]     Test Name Result Flag Reference   We are unable to ascertain the test(s) you desire  for the irreplaceable specimen you submitted     UNCLEAR ORDER:      CULTURE, UPPER RESPIRATORY   SPECIMEN(S) SUBMITTED: RED TOP SWAB     RESOLUTION:      **     **     *******     *******     *********    **     **   ******   **     **     **   **     **          **           ****   **     **   **     **     ** ***      **          **           **  ** **     **   **     **     *****       ** *****    *******      **    ***     **   **     **     **   **     **     **   **           **     **     **   **     **     **    **    **     **   **           **     **     **   *********     **    **    *********   *********    **     **     **

## 2018-01-18 NOTE — RESULT NOTES
Discussion/Summary     food allergy panel showed some sensitivity to gluten, milk, wheat and sesame seeds  she can try to avoid those food and see GI  specialist as scheduled   - Dr Francois Coley     Verified Results  (1) ALLERGY, FOOD PANEL 68Mol6132 10:17AM Anton Muñoz Order Number: [id number]     Test Name Result Flag Reference   FISH COD <0 10 kUA/I  0 00-0 09   EGG WHITE <0 10 kUA/I  0 00-0 09   GLUTEN 0 22 kUA/I H 0 00-0 09   MILK 0 12 kUA/I H 0 00-0 09   PEANUT <0 10 kUA/I  0 00-0 09   F041 SALMON <0 10 kUA/I  0 00-0 09   SCALLOP 0 37 kUA/l H 0 00-0 09   SESAME 0 41 kUA/I H 0 00-0 09   SHRIMP <0 10 kUA/l  0 00-0 09   SOYBEAN <0 10 kUA/I  0 00-0 09   ALLERGEN TUNA (F40) IGE <0 10 kUA/I  0 00-0 09   WALNUT <0 10 kUA/I  0 00-0 09   WHEAT 1 06 kUA/I H 0 00-0 09   TOTAL  kU/l H 0-113   ALLERGEN ALMONDS <0 10 kUA/I  0 00-0 09   ALLERGEN CASHEW <0 10 kUA/I  0 00-0 09   ALLERGEN HAZELNUT/FILBERT IGE <0 10 kUA/l  0 00-0 09   ALLERGEN COMMENT See Below     As in all diagnostic testing, a diagnosis should be made by the physician based on both test results and patient clinical history         Signatures   Electronically signed by : Heather White MD; Sep 13 2017 12:39PM EST                       (Author)

## 2018-01-23 VITALS
OXYGEN SATURATION: 98 % | HEART RATE: 83 BPM | SYSTOLIC BLOOD PRESSURE: 118 MMHG | WEIGHT: 145 LBS | BODY MASS INDEX: 25.69 KG/M2 | TEMPERATURE: 97.3 F | DIASTOLIC BLOOD PRESSURE: 64 MMHG

## 2018-01-23 NOTE — RESULT NOTES
Discussion/Summary   Please inform the patient that her CT scan show significant inflammation through much of her ileum  This is consistent with her newly diagnosed Crohn's disease  Please make sure she has office follow-up in the next 4 weeks with myself  Okay to over booked  Please call with any questions or concerns  Verified Results  CT SMALL BOWEL ENTEROGRAPHY [id number] 07:20AM Aaron Anaya Order Number: [id number]    - Patient Instructions: To schedule this appointment, please contact Central Scheduling at [phone]  Test Name Result Flag Reference   CT SMALL BOWEL ENTEROGRAPHY (Report)     CT ABDOMEN AND PELVIS WITH IV CONTRAST- ENTEROGRAPHY - WITH CONTRAST     INDICATION: K50 80: Crohn's disease of both small and large intestine without complications  History taken directly from the electronic ordering system  COMPARISON: None  TECHNIQUE: Contrast-enhanced CT examination of the abdomen and pelvis was performed utilizing thin section technique and after the administration of low density enteric contrast according to protocol designed specifically to obtain sensitive evaluation    of the small bowel  Reformatted images were created in axial, sagittal, and coronal planes  Radiation dose length product (DLP) for this visit: 613 mGy-cm   This examination, like all CT scans performed in the [facility], was performed utilizing techniques to minimize radiation dose exposure, including the use of iterative    reconstruction and automated exposure control  IV Contrast: 100 mL of iohexol (OMNIPAQUE)       Enteric Contrast:        FINDINGS:      ABDOMEN     SMALL BOWEL: There is diffuse wall thickening with increased mucosal enhancement, and mesenteric hyperemia, involving the distal ileum through the terminal ileum, compatible with active inflammatory bowel disease  There is no obstruction  LARGE BOWEL: Normal caliber   No focal wall thickening or pericolonic inflammatory change  LOWER CHEST: No significant abnormality in the lung bases  STOMACH: Unremarkable  LIVER/BILIARY TREE: Unremarkable  GALLBLADDER: No calcified gallstones  No pericholecystic inflammatory change  SPLEEN: Unremarkable  PANCREAS: Unremarkable  ADRENAL GLANDS: Unremarkable  KIDNEYS/URETERS: One or more sharply circumscribed subcentimeter renal hypodensities are noted  These lesions are too small to accurately characterize, but are statistically most likely to represent benign cortical renal cyst(s)  According to the    guidelines published in the [facility] Paper of the ACR Incidental Findings Committee (Radiology 2010), no further workup of these lesions is recommended  PELVIS     REPRODUCTIVE ORGANS: Unremarkable for patient's age  URINARY BLADDER: Unremarkable  APPENDIX: No findings to suggest appendicitis  ABDOMINOPELVIC CAVITY: Small free fluid in the pelvis  No pneumoperitoneum  Multiple mildly central mesenteric lymph nodes, presumably reactive in nature  VESSELS: Unremarkable for patient's age  ABDOMINAL WALL/INGUINAL REGIONS: Unremarkable  OSSEOUS STRUCTURES: No destructive osseous lesion  IMPRESSION:     Extensive enteritis involving the distal ileum compatible with active inflammatory bowel disease         Workstation performed: [id number]     Signed by:   Dustin Huerta MD   12/22/17

## 2018-01-23 NOTE — RESULT NOTES
Discussion/Summary   Please inform the patient that her inflammatory markers are improving and her blood count is stable  This is good news  We should continue current medications if she is doing well  We will see her back in a few months and consider additional treatments       Verified Results  (1) CBC/ PLT (NO DIFF) [id number] 09:06AM Deejay Waldenshelby Order Number: [id number]     Test Name Result Flag Reference   HEMATOCRIT 37 9 %  34 8-46 1   HEMOGLOBIN 11 2 g/dL L 11 5-15 4   MCHC 29 6 g/dL L 31 4-37 4   MCH 24 9 pg L 26 8-34 3   MCV 84 fL  82-98   PLATELET COUNT 842 Thousands/uL  149-390   RBC COUNT 4 49 Million/uL  3 81-5 12   RDW 15 0 %  11 6-15 1   WBC COUNT 8 40 Thousand/uL  4 31-10 16   MPV 7 5 fL L 8 9-12 7     (1) C-REACTIVE PROTEIN 12Jan2018 09:06AM Venson Boeck    Order Number: [id number]     Test Name Result Flag Reference   C-REACT PROTEIN 27 8 mg/L H <3 0

## 2018-01-23 NOTE — RESULT NOTES
Discussion/Summary   Elevated fecal calprotectin, consistent with inflammation  Please find out if her symptoms have improved on the current medications, we will see her at off next office visit  If she is not better please let me know we can consider additional therapies at this time  Verified Results  (1) QUANTIFERON - TB GOLD [id number] 02:37PM Jesus Mann     Test Name Result Flag Reference   QUANTIFERON TB GOLD      Incubated, specimen forwarded to [place], [place] for  completion of the assay  Performed at:  [facility] [address]  [id number]  : Isabel Daly MD, Phone:  [phone]   QUANTIFERON TB GOLD Negative  Negative   QUANTIFERON CRITERIA Comment     To be considered positive a specimen should have a TB Ag minus Nil  value greater than or equal to 0 35 IU/mL and in addition the TB Ag  minus Nil value must be greater than or equal to 25% of the Nil  value  There may be insufficient information in these values to  differentiate between some negative and some indeterminate test  values  QUANTIFERON TB AG VALUE 0 07 IU/mL     QUANTIFERON NIL VALUE 0 07 IU/mL     QUANTIFERON MITOGEN VALUE 2 63 IU/mL     QFT TB AG MINUS NIL VALUE 0 00 IU/mL     QFT INTERPRETATION Comment     The QuantiFERON TB Gold (in Tube) assay is intended for use as an aid  in the diagnosis of TB infection  Negative results suggest that there  is no TB infection  In patients with high suspicion of exposure, a  negative test should be repeated  A positive test indicates infection  with Mycobacterium tuberculosis  Among individuals without  tuberculosis infection, a positive test may be due to exposure to  [place], M  jayi or M  marinum  On the Internet, go to  cdc gov/tb for further details    Performed at:  [facility] [address]  [id number]  : Isabel Daly MD, Phone:  [phone]     (1) CALPROTECTIN, FECAL [id number] 05:48PM Verna Jesus    Order Number: [id number]     Test Name Result Flag Reference   CALPROTECTIN, FECAL 317 ug/g H 0 - 120   Concentration     Interpretation   Follow-Up  <16 - 50 ug/g     Normal           None  >50 -120 ug/g     Borderline       Re-evaluate in 4-6 weeks      >120 ug/g     Abnormal         Repeat as clinically                                      indicated    Performed at:  [address]  [id number]  : Nasreen Ballesteros MD, Phone:  [phone]

## 2018-01-23 NOTE — RESULT NOTES
Discussion/Summary   I have tried several times to contact the patient  Left 2 or 3 voicemail for the patient and emergency contact her father  Please contact the patient  Please inform her that biopsies are most consistent with Crohn's disease  I had ordered an MRI for the patient when I saw her at the time of her colonoscopy as well as some laboratory studies  Please make sure those are complete  I would like to start her on some medicines for treatment including budesonide and Pentasa  I will send those prescriptions in  Please have her take these as prescribed, please have her call the office and have her scheduled for an office follow-up in 4 weeks to discuss further  Please have the patient called up with any questions or concerns  Verified Results  (1) TISSUE EXAM [id number] 09:25AM Donavanfatou Codie     Test Name Result Flag Reference   LAB AP CASE REPORT (Report)     Surgical Pathology Report             Case: [id number]                   Authorizing Provider: Ish Warner MD      Collected:      11/16/2017 0925        Ordering Location:   [facility]    Received:      11/16/2017 [place] [address]                            Pathologist:      Caleb Dickerson MD                                 Specimens:  A) - Duodenum, duodenum bx                                       B) - Stomach, gastric body bx                                     C) - Esophagus, distal esophagus bx                                  D) - Colon, terminal ileum bx                                     E) - Colon, ascending colon bx                                     F) - Colon, transverse colon bx   LAB AP FINAL DIAGNOSIS (Report)     A  Duodenum (biopsy):    - Small bowel mucosa with focal mild increase in intraepithelial   lymphocytes  - No villous atrophy noted      - No active inflammation, granulomas, organisms, dysplasia or neoplasia   identified  B  Gastric body (biopsy):    - Chronic inactive gastritis involving oxyntic and foveolar mucosa  - Rare possible nonnecrotizing granuloma noted  - Immunostain for H  pylori (with appropriate positive control) is   negative  - No intestinal metaplasia, dysplasia or neoplasia identified  C  Distal esophagus (biopsy):    - Mild chronic inflammation involving reactive squamous mucosa  - Eosinophils number less than 2 per HPF      - No dysplasia or neoplasia identified  D  Terminal ileum (biopsy):    - Moderate active ileitis with surface erosion and villous blunting     - Rare possible nonnecrotizing granuloma noted  - No dysplasia or neoplasia identified  E  Ascending colon (biopsy):    - Suggestion of nonnecrotizing granuloma involving edematous colonic   mucosa  - No dysplasia or neoplasia identified  F  Transverse colon (biopsy):    - Nonnecrotizing granulomas associated with moderate active colitis and   surface erosion  - CMV study pending      - No dysplasia or neoplasia identified  Comment:  The findings suggest Crohn's disease  Electronically signed by Sintia Harrison MD on 11/18/2017 at 7:44 AM   LAB AP NOTE      Interpretation performed at [address]  LAB AP SURGICAL ADDITIONAL INFORMATION (Report)     All controls performed with the immunohistochemical stains reported above   reacted appropriately  These tests were developed and their performance   characteristics determined by [facility] or   [address] [place] [address]  They may not be cleared or approved by the [place]  Food and Drug Administration  The FDA has determined that such clearance   or approval is not necessary  These tests are used for clinical purposes  They should not be regarded as investigational or for research   This   laboratory has been approved by [place] [address], designated as a high-complexity   laboratory and is qualified to perform these tests  LAB AP GROSS DESCRIPTION (Report)     A  The specimen is received in formalin, labeled with the patient's name   and hospital number, and is designated Duodenum biopsy, are multiple   irregularly shaped fragments of tan soft tissue measuring 0 7 x 0 7 x 0 1   cm in aggregate  Entirely submitted  One cassette  B  The specimen is received in formalin, labeled with the patient's name   and hospital number, and is designated Gastric body biopsy, are   multiple irregularly shaped fragments of tan soft tissue measuring 1 0 x   0 7 x 0 1 cm in aggregate  Entirely submitted  One cassette  C  The specimen is received in formalin, labeled with the patient's name   and hospital number, and is designated Distal esophagus biopsy, are   multiple irregularly shaped fragments of tan soft tissue measuring 0 5 x   0 5 x 0 1 cm in aggregate  Entirely submitted  One cassette  D  The specimen is received in formalin, labeled with the patient's name   and hospital number, and is designated Terminal ileum biopsy, are   multiple irregularly shaped fragments of tan-red soft tissue measuring 0 8   x 0 5 x 0 1 cm in aggregate  Entirely submitted  One cassette  E  The specimen is received in formalin, labeled with the patient's name   and hospital number, and is designated Ascending colon biopsy, are   three irregularly shaped fragments of tan soft tissue measuring 0 5 x 0 3   x 0 1 cm in aggregate  Entirely submitted  One cassette  F  The specimen is received in formalin, labeled with the patient's name   and hospital number, and is designated Transverse colon biopsy, are   three irregularly shaped fragments of tan soft tissue each measuring 0 3   cm in greatest dimension  Entirely submitted  One cassette  Note: The estimated total formalin fixation time based upon information   provided by the submitting clinician and the standard processing schedule   is less than 72 hours      Sebastian Hernandez LAB AP CLINICAL INFORMATION      Diarrhea  Iron deficiency anemia  Change in bowel habits  Recent weight loss  R/o celiac   LAB AP ADDENDUM 1      CMV study performed on the transverse colon biopsy (part F, with   appropriate control) is negative    Addendum electronically signed by Eagle Jovel MD on 11/21/2017 at 12:51 PM

## 2018-01-23 NOTE — RESULT NOTES
Discussion/Summary   Please inform the patient that her CRP, inflammatory marker is quite elevated  Please make sure that she starts medication that we have prescribed  I will see her at upcoming office visit  If symptoms worsen or change in the meantime please make sure the patient calls me, we may have to try additional medications  Please have the patient call with any questions or concerns       Verified Results  (1) C-REACTIVE PROTEIN 08Dec2017 02:26PM Perez Amel Order Number: [id number]     Test Name Result Flag Reference   C-REACT PROTEIN 40 8 mg/L H <3 0     (1) SED RATE 08Dec2017 02:26PM Perez Amel Order Number: [id number]     Test Name Result Flag Reference   SED RATE 3 mm/hour  0-20     (1) HEP B CORE AB, TOTAL 08Dec2017 02:26PM Perez Amel Order Number: [id number]     Test Name Result Flag Reference   HEPATITIS B CORE TOTAL ANTIBODY Non-reactive  Non-reactive     (1) HEP B SURFACE ANTIBODY 08Dec2017 02:26PM Neel Brunson    Order Number: [id number]     Test Name Result Flag Reference   HEPATITIS B SURFACE ANTIBODY 54 92 mIU/mL     Protective Immunity: Hep B Surface Antibody >= 10 mIu/ml (Traceable to [facility] Reference Preparation)     (1) HEP B SURFACE ANTIGEN [id number] 02:26PM Perez Amel Order Number: [id number]     Test Name Result Flag Reference   HEPATITIS B SURFACE ANTIGEN Non-reactive  Non-reactive, NonReactive - Confirmed

## 2018-04-06 ENCOUNTER — TELEPHONE (OUTPATIENT)
Dept: GASTROENTEROLOGY | Facility: AMBULARY SURGERY CENTER | Age: 28
End: 2018-04-06

## 2018-05-08 ENCOUNTER — OFFICE VISIT (OUTPATIENT)
Dept: GASTROENTEROLOGY | Facility: AMBULARY SURGERY CENTER | Age: 28
End: 2018-05-08
Payer: COMMERCIAL

## 2018-05-08 ENCOUNTER — TELEPHONE (OUTPATIENT)
Dept: GASTROENTEROLOGY | Facility: CLINIC | Age: 28
End: 2018-05-08

## 2018-05-08 VITALS
WEIGHT: 139 LBS | HEIGHT: 63 IN | DIASTOLIC BLOOD PRESSURE: 62 MMHG | BODY MASS INDEX: 24.63 KG/M2 | HEART RATE: 75 BPM | SYSTOLIC BLOOD PRESSURE: 106 MMHG | TEMPERATURE: 97.5 F

## 2018-05-08 DIAGNOSIS — K50.80 CROHN'S DISEASE OF BOTH SMALL AND LARGE INTESTINE WITHOUT COMPLICATION (HCC): Primary | ICD-10-CM

## 2018-05-08 PROCEDURE — 99213 OFFICE O/P EST LOW 20 MIN: CPT | Performed by: INTERNAL MEDICINE

## 2018-05-08 RX ORDER — SULFASALAZINE 500 MG/1
1000 TABLET, DELAYED RELEASE ORAL 2 TIMES DAILY
Qty: 6000 TABLET | Refills: 3 | Status: SHIPPED | OUTPATIENT
Start: 2018-05-08 | End: 2018-09-05 | Stop reason: SDUPTHER

## 2018-05-08 RX ORDER — PANTOPRAZOLE SODIUM 40 MG/1
1 TABLET, DELAYED RELEASE ORAL
Status: ON HOLD | COMMUNITY
Start: 2017-10-19 | End: 2018-10-10

## 2018-05-08 RX ORDER — BUDESONIDE 3 MG/1
CAPSULE, COATED PELLETS ORAL
COMMUNITY
Start: 2017-12-04 | End: 2018-05-08 | Stop reason: ALTCHOICE

## 2018-05-08 RX ORDER — MESALAMINE 500 MG/1
CAPSULE ORAL
Refills: 6 | COMMUNITY
Start: 2018-03-12 | End: 2018-05-08 | Stop reason: ALTCHOICE

## 2018-05-08 RX ORDER — FERROUS SULFATE 325(65) MG
1 TABLET ORAL 2 TIMES DAILY
COMMUNITY
Start: 2017-09-14

## 2018-05-08 RX ORDER — FERROUS SULFATE 7.5 MG/0.5
SYRINGE (EA) ORAL
COMMUNITY

## 2018-05-08 NOTE — LETTER
May 8, 2018     Amena Rojas, [address] [facility]  [address] [address]  [address] [address]    Patient: Rubén Kc   YOB: 1990   Date of Visit: 5/8/2018       Dear Dr Emory Bauer:    Thank you for referring Sander Jos to me for evaluation  Below are my notes for this consultation  If you have questions, please do not hesitate to call me  I look forward to following your patient along with you  Sincerely,        Melly Joseph MD        CC: No Recipients  Melly Joseph MD  5/8/2018 11:20 PM  Sign at close encounter  [address] [facility]  Rubén Kc 34 y o  female MRN: [id number]            Assessment & Plan:    59-year-old female with recently diagnosed Crohn's disease of both the small intestine and colon, initially presented with diarrhea  She had been treated with Pentasa and budesonide, and she noted today she stop both of those medications due to various side effects, initially due to concern for headache and back pain  Certainly her symptom of back pain onset really sounded musculoskeletal not likely to be due to the therapies  1  Crohn's disease of both small and large intestine without complication ([address] )  - since she was taking both budesonide and Pentasa for 3 months without any symptoms, the pain is likely musculoskeletal and not due to medications  I explained that the two medications are very different, and it is highly unlikely that they are both causing the same issue  I also explained that Crohn's disease itself can be what is causing her back pain    -We will start her on a low dose of sulfasalazine to keep the Crohn's under control, and see if her back pain improves  I explained the importance of informing us before stopping any medication, she verbalized understanding    - discontinue pantoprazole, Pentasa and budesonide   Avoid taking ibuprofen or other NSAIDs   - I will order a stool test and labs to monitor her inflammatory markers, blood counts, and liver function  -encouraged the patient to bring her family during her next visit, I suspect that fall of and they helped did discuss therapeutic options in side effects  -we will defer endoscopic evaluation for now       She will follow up in 2 months   ___________________________________________________________      CC: Crohn's disease     HPI:  Goldie Foley is a 34 y  o female with a history of seizure disorder, cerebral palsyhistory and Crohn's disease who is here for follow up  She says she is doing well despite stoping Pentasa and budesonide on her own  She reports that she stopped taking the medications 2 months ago because she was experiencing severe lower back pain  She reports that her bowel movements have been normal; she has 1 formed bowel movement every morning  She denies abdominal pain, change in bowel habits, change in stool caliber, melena, hematochezia, rectal bleeding, tenesmus, change in appetite, nausea, vomiting, diarrhea, GERD, dysphagia, odynophagia and weight loss  Her last colonoscopy and EGD were in November of 2017  She is a nonsmoker, and denies alcohol use  She does report using ibuprofen for her back pain  ROS:  The remainder of the ROS was negative except for the pertinent positives mentioned in HPI        Allergies: Gluten meal; Lac bovis; and Wheat bran    Medications:   Current Outpatient Prescriptions:     ferrous sulfate (KIM-IN-SOL) 75 (15 Fe) mg/mL drops, Take by mouth, Disp: , Rfl:     ferrous sulfate 325 (65 Fe) mg tablet, Take 1 tablet by mouth Twice daily, Disp: , Rfl:     lamoTRIgine (LaMICtal) 200 MG tablet, Take 100 mg by mouth 2 (two) times a day, Disp: , Rfl:     levocetirizine (XYZAL) 5 MG tablet, Take 5 mg by mouth every evening, Disp: , Rfl:     pantoprazole (PROTONIX) 40 mg tablet, Take 1 tablet by mouth, Disp: , Rfl:     zonisamide (ZONEGRAN) 100 mg capsule, Take 50 mg by mouth 2 (two) times a day, Disp: , Rfl:     sulfaSALAzine (AZULFIDINE-EN) 500 mg EC tablet, Take 2 tablets (1,000 mg total) by mouth 2 (two) times a day, Disp: 6000 tablet, Rfl: 3    Past Medical History:   Diagnosis Date    Cerebral palsy ([address] )     Intracere[facility] [address] )     Premature birth     Seizures ([facility] [address] )     last seizure sept 2017       Past Surgical History:   Procedure Laterality Date    ACHILLES TENDON REPAIR      ruptured, length of tendon, no rupture    BRAIN SURGERY      EYE SURGERY      AK COLONOSCOPY FLX DX W/COLLJ SPEC WHEN PFRMD N/A 11/16/2017    Procedure: EGD AND COLONOSCOPY;  Surgeon: Lenny Perdomo MD;  [facility]; Service: Gastroenterology    TONSILLECTOMY      with adenoidectomy    VAGAL NERVE STIMULATOR (VNS) PLACEMENT      by incision       Family History   Problem Relation Age of Onset    Ovarian cancer Mother         reports that she has never smoked  She has never used smokeless tobacco  She reports that she does not drink alcohol or use drugs  Physical Exam:    /62 (BP Location: Left arm, Patient Position: Sitting, Cuff Size: Standard)   Pulse 75   Temp 97 5 °F (36 4 °C) (Tympanic)   Ht 5' 3" (1 6 m)   Wt 63 kg (139 lb)   BMI 24 62 kg/m²      Gen: wn/wd, NAD  HEENT: anicteric, MMM, no cervical LAD  CVS: RRR, no m/r/g  CHEST: CTA b/l  ABD: +BS, soft, NT,ND, no hepatosplenomegaly  EXT: no c/c/e  NEURO: aaox3  SKIN: NO rashes    Attestation:   By signing my name below, INemo attest that this documentation has been prepared under the direction and in the presence of Lneny Perdomo MD  Electronically Signed: Wale Qureshi  05/08/2018  ILenny Se, personally performed the services described in this documentation  All medical record entries made by the christinaibasia were at my direction and in my presence  I have reviewed the chart and discharge instructions and agree that the record reflects my personal performance and is accurate and complete  Lenny Perdomo MD  05/08/2018

## 2018-05-08 NOTE — PROGRESS NOTES
[facility]  Nia Cross 34 y o  female MRN: [id number]            Assessment & Plan:    74-year-old female with recently diagnosed Crohn's disease of both the small intestine and colon, initially presented with diarrhea  She had been treated with Pentasa and budesonide, and she noted today she stop both of those medications due to various side effects, initially due to concern for headache and back pain  Certainly her symptom of back pain onset really sounded musculoskeletal not likely to be due to the therapies  1  Crohn's disease of both small and large intestine without complication ([address] )  - since she was taking both budesonide and Pentasa for 3 months without any symptoms, the pain is likely musculoskeletal and not due to medications  I explained that the two medications are very different, and it is highly unlikely that they are both causing the same issue  I also explained that Crohn's disease itself can be what is causing her back pain    -We will start her on a low dose of sulfasalazine to keep the Crohn's under control, and see if her back pain improves  I explained the importance of informing us before stopping any medication, she verbalized understanding    - discontinue pantoprazole, Pentasa and budesonide  Avoid taking ibuprofen or other NSAIDs   - I will order a stool test and labs to monitor her inflammatory markers, blood counts, and liver function  -encouraged the patient to bring her family during her next visit, I suspect that fall of and they helped did discuss therapeutic options in side effects  -we will defer endoscopic evaluation for now       She will follow up in 2 months   ___________________________________________________________      CC: Crohn's disease     HPI:  Nia Cross is a 34 y  o female with a history of seizure disorder, cerebral palsyhistory and Crohn's disease who is here for follow up   She says she is doing well despite stoping Pentasa and budesonide on her own  She reports that she stopped taking the medications 2 months ago because she was experiencing severe lower back pain  She reports that her bowel movements have been normal; she has 1 formed bowel movement every morning  She denies abdominal pain, change in bowel habits, change in stool caliber, melena, hematochezia, rectal bleeding, tenesmus, change in appetite, nausea, vomiting, diarrhea, GERD, dysphagia, odynophagia and weight loss  Her last colonoscopy and EGD were in November of 2017  She is a nonsmoker, and denies alcohol use  She does report using ibuprofen for her back pain  ROS:  The remainder of the ROS was negative except for the pertinent positives mentioned in HPI        Allergies: Gluten meal; Lac bovis; and Wheat bran    Medications:   Current Outpatient Prescriptions:     ferrous sulfate (KIM-IN-SOL) 75 (15 Fe) mg/mL drops, Take by mouth, Disp: , Rfl:     ferrous sulfate 325 (65 Fe) mg tablet, Take 1 tablet by mouth Twice daily, Disp: , Rfl:     lamoTRIgine (LaMICtal) 200 MG tablet, Take 100 mg by mouth 2 (two) times a day, Disp: , Rfl:     levocetirizine (XYZAL) 5 MG tablet, Take 5 mg by mouth every evening, Disp: , Rfl:     pantoprazole (PROTONIX) 40 mg tablet, Take 1 tablet by mouth, Disp: , Rfl:     zonisamide (ZONEGRAN) 100 mg capsule, Take 50 mg by mouth 2 (two) times a day, Disp: , Rfl:     sulfaSALAzine (AZULFIDINE-EN) 500 mg EC tablet, Take 2 tablets (1,000 mg total) by mouth 2 (two) times a day, Disp: 6000 tablet, Rfl: 3    Past Medical History:   Diagnosis Date    Cerebral palsy ([address] )     Intracerebral hemorrhage ([address] )     Premature birth     Seizures ([facility] [address] )     last seizure sept 2017       Past Surgical History:   Procedure Laterality Date    ACHILLES TENDON REPAIR      ruptured, length of tendon, no rupture    BRAIN SURGERY      EYE SURGERY      NC COLONOSCOPY FLX DX W/COLLJ SPEC WHEN PFRMD N/A 11/16/2017    Procedure: EGD AND COLONOSCOPY; Surgeon: Deneen Thakur MD;  [facility]: [facility]; Service: Gastroenterology    TONSILLECTOMY      with adenoidectomy    VAGAL NERVE STIMULATOR (VNS) PLACEMENT      by incision       Family History   Problem Relation Age of Onset    Ovarian cancer Mother         reports that she has never smoked  She has never used smokeless tobacco  She reports that she does not drink alcohol or use drugs  Physical Exam:    /62 (BP Location: Left arm, Patient Position: Sitting, Cuff Size: Standard)   Pulse 75   Temp 97 5 °F (36 4 °C) (Tympanic)   Ht 5' 3" (1 6 m)   Wt 63 kg (139 lb)   BMI 24 62 kg/m²     Gen: wn/wd, NAD  HEENT: anicteric, MMM, no cervical LAD  CVS: RRR, no m/r/g  CHEST: CTA b/l  ABD: +BS, soft, NT,ND, no hepatosplenomegaly  EXT: no c/c/e  NEURO: aaox3  SKIN: NO rashes    Attestation:   By signing my name below, IGabriel, attest that this documentation has been prepared under the direction and in the presence of Deneen Thakur MD  Electronically Signed: Wale Logan  05/08/2018  I, Deneen Thakur, personally performed the services described in this documentation  All medical record entries made by the scribe were at my direction and in my presence  I have reviewed the chart and discharge instructions and agree that the record reflects my personal performance and is accurate and complete  Deneen Thakur MD  05/08/2018

## 2018-05-08 NOTE — TELEPHONE ENCOUNTER
KUTTY PATIENT    walgrenns would like a call back to confirm the quantity of sulfasalazine---call back # [phone]

## 2018-05-18 ENCOUNTER — LAB (OUTPATIENT)
Dept: LAB | Facility: CLINIC | Age: 28
End: 2018-05-18
Payer: COMMERCIAL

## 2018-05-18 DIAGNOSIS — K50.80 CROHN'S DISEASE OF BOTH SMALL AND LARGE INTESTINE WITHOUT COMPLICATION (HCC): ICD-10-CM

## 2018-05-18 LAB
ALBUMIN SERPL BCP-MCNC: 3.2 G/DL (ref 3.5–5)
ALP SERPL-CCNC: 70 U/L (ref 46–116)
ALT SERPL W P-5'-P-CCNC: 22 U/L (ref 12–78)
ANION GAP SERPL CALCULATED.3IONS-SCNC: 9 MMOL/L (ref 4–13)
AST SERPL W P-5'-P-CCNC: 19 U/L (ref 5–45)
BASOPHILS # BLD AUTO: 0.02 THOUSANDS/ΜL (ref 0–0.1)
BASOPHILS NFR BLD AUTO: 0 % (ref 0–1)
BILIRUB DIRECT SERPL-MCNC: 0.08 MG/DL (ref 0–0.2)
BILIRUB SERPL-MCNC: 0.2 MG/DL (ref 0.2–1)
BUN SERPL-MCNC: 13 MG/DL (ref 5–25)
CALCIUM SERPL-MCNC: 8.4 MG/DL (ref 8.3–10.1)
CHLORIDE SERPL-SCNC: 106 MMOL/L (ref 100–108)
CO2 SERPL-SCNC: 24 MMOL/L (ref 21–32)
CREAT SERPL-MCNC: 0.94 MG/DL (ref 0.6–1.3)
CRP SERPL QL: 8.3 MG/L
EOSINOPHIL # BLD AUTO: 0.06 THOUSAND/ΜL (ref 0–0.61)
EOSINOPHIL NFR BLD AUTO: 1 % (ref 0–6)
ERYTHROCYTE [DISTWIDTH] IN BLOOD BY AUTOMATED COUNT: 15 % (ref 11.6–15.1)
ERYTHROCYTE [SEDIMENTATION RATE] IN BLOOD: 7 MM/HOUR (ref 0–20)
GFR SERPL CREATININE-BSD FRML MDRD: 84 ML/MIN/1.73SQ M
GLUCOSE P FAST SERPL-MCNC: 90 MG/DL (ref 65–99)
HCT VFR BLD AUTO: 38.1 % (ref 34.8–46.1)
HGB BLD-MCNC: 12 G/DL (ref 11.5–15.4)
LYMPHOCYTES # BLD AUTO: 1.29 THOUSANDS/ΜL (ref 0.6–4.47)
LYMPHOCYTES NFR BLD AUTO: 20 % (ref 14–44)
MCH RBC QN AUTO: 27.1 PG (ref 26.8–34.3)
MCHC RBC AUTO-ENTMCNC: 31.5 G/DL (ref 31.4–37.4)
MCV RBC AUTO: 86 FL (ref 82–98)
MONOCYTES # BLD AUTO: 0.62 THOUSAND/ΜL (ref 0.17–1.22)
MONOCYTES NFR BLD AUTO: 9 % (ref 4–12)
NEUTROPHILS # BLD AUTO: 4.62 THOUSANDS/ΜL (ref 1.85–7.62)
NEUTS SEG NFR BLD AUTO: 70 % (ref 43–75)
PLATELET # BLD AUTO: 270 THOUSANDS/UL (ref 149–390)
PMV BLD AUTO: 8.2 FL (ref 8.9–12.7)
POTASSIUM SERPL-SCNC: 4.1 MMOL/L (ref 3.5–5.3)
PROT SERPL-MCNC: 6.9 G/DL (ref 6.4–8.2)
RBC # BLD AUTO: 4.43 MILLION/UL (ref 3.81–5.12)
SODIUM SERPL-SCNC: 139 MMOL/L (ref 136–145)
WBC # BLD AUTO: 6.61 THOUSAND/UL (ref 4.31–10.16)

## 2018-05-18 PROCEDURE — 85025 COMPLETE CBC W/AUTO DIFF WBC: CPT

## 2018-05-18 PROCEDURE — 80048 BASIC METABOLIC PNL TOTAL CA: CPT

## 2018-05-18 PROCEDURE — 36415 COLL VENOUS BLD VENIPUNCTURE: CPT

## 2018-05-18 PROCEDURE — 85652 RBC SED RATE AUTOMATED: CPT

## 2018-05-18 PROCEDURE — 80076 HEPATIC FUNCTION PANEL: CPT

## 2018-05-18 PROCEDURE — 86140 C-REACTIVE PROTEIN: CPT

## 2018-05-19 ENCOUNTER — APPOINTMENT (OUTPATIENT)
Dept: LAB | Facility: CLINIC | Age: 28
End: 2018-05-19
Payer: COMMERCIAL

## 2018-05-19 DIAGNOSIS — K50.80 CROHN'S DISEASE OF BOTH SMALL AND LARGE INTESTINE WITHOUT COMPLICATION (HCC): ICD-10-CM

## 2018-05-19 PROCEDURE — 83993 ASSAY FOR CALPROTECTIN FECAL: CPT

## 2018-05-24 LAB — CALPROTECTIN STL-MCNT: 259 UG/G (ref 0–120)

## 2018-07-24 ENCOUNTER — OFFICE VISIT (OUTPATIENT)
Dept: GASTROENTEROLOGY | Facility: AMBULARY SURGERY CENTER | Age: 28
End: 2018-07-24
Payer: COMMERCIAL

## 2018-07-24 VITALS
SYSTOLIC BLOOD PRESSURE: 116 MMHG | BODY MASS INDEX: 26.05 KG/M2 | DIASTOLIC BLOOD PRESSURE: 70 MMHG | HEIGHT: 63 IN | WEIGHT: 147 LBS | HEART RATE: 73 BPM | TEMPERATURE: 97.3 F

## 2018-07-24 DIAGNOSIS — K50.818 CROHN'S DISEASE OF BOTH SMALL AND LARGE INTESTINE WITH OTHER COMPLICATION (HCC): Primary | ICD-10-CM

## 2018-07-24 PROCEDURE — 99213 OFFICE O/P EST LOW 20 MIN: CPT | Performed by: INTERNAL MEDICINE

## 2018-07-24 NOTE — LETTER
July 24, 2018     Carla Becker, [address] [facility]  [address] [address]  [address] [address]    Patient: Kimi Henriquez   YOB: 1990   Date of Visit: 7/24/2018       Dear Dr Marine Benitez:    Thank you for referring Yuniel Guo to me for evaluation  Below are my notes for this consultation  If you have questions, please do not hesitate to call me  I look forward to following your patient along with you  Sincerely,        Umang Fang MD        CC: No Recipients  Umang Fang MD  7/24/2018 10:46 AM  Sign at close encounter  [address] [facility]  Kimi Henriquez 34 y o  female MRN: [id number]            Assessment & Plan:    Pleasant 53-year-old female with Crohn's disease primary of her ileum with scattered colonic disease as well, had adverse effects to medications which started at the same time including pantoprazole, budesonide, Pentasa  She had complained of arthralgias and she was switched over to sulfasalazine  1   Crohn's disease of small and large intestine without complications:  -clinically much improved and doing very well, continue sulfasalazine  -we will check inflammatory markers, sed rate, CRP, BMP at this time  -we will have the patient follow up in 2 months time, at that time we will get her set up for repeat colonoscopy to evaluate for mucosal healing  -continue sulfasalazine otherwise  -patient was instructed to give us a call with any change in symptoms or flare-up  -instructed to continue to avoid NSAIDs      _____________________________________________________________        CC: Follow-up of Crohn's disease    HPI:  Kimi Henriquez is a 34 y  o female who is here for follow-up of Crohn's disease, primarily in her ileum but also scattered ulcerations throughout her colon  Initially was treated with Pentasa and budesonide, the patient reported side effects notably arthralgias with these medications  Her last visit weeks which were sulfasalazine    She reports that she is doing much better, her back pain has become much less frequent, usually low back and radiates upwards  She does not take any NSAID the time  Her bowel movements are significantly improved, she had 1 formed stool daily  Denies any abdominal pain, bloating, nausea, vomiting, diarrhea, melena, rectal bleeding  Her weight has been stable  She denies any other significant arthralgias  Unclear if her initial symptoms of side effects were due to the PPI therapy Pentasa budesonide but overall she is feeling much better at this time  Since her last visit she had laboratory studies which demonstrated some improvement in her inflammatory markers including fecal calprotectin however there is still elevated  Patient recently started working dietary services at [employer]  ROS:  The remainder of the ROS was negative except for the pertinent positives mentioned in HPI        Allergies: Gluten meal; Lac bovis; and Wheat bran    Medications:   Current Outpatient Prescriptions:     ferrous sulfate (KIM-IN-SOL) 75 (15 Fe) mg/mL drops, Take by mouth, Disp: , Rfl:     ferrous sulfate 325 (65 Fe) mg tablet, Take 1 tablet by mouth Twice daily, Disp: , Rfl:     lamoTRIgine (LaMICtal) 200 MG tablet, Take 100 mg by mouth 2 (two) times a day, Disp: , Rfl:     sulfaSALAzine (AZULFIDINE-EN) 500 mg EC tablet, Take 2 tablets (1,000 mg total) by mouth 2 (two) times a day, Disp: 6000 tablet, Rfl: 3    zonisamide (ZONEGRAN) 100 mg capsule, Take 50 mg by mouth 2 (two) times a day, Disp: , Rfl:     levocetirizine (XYZAL) 5 MG tablet, Take 5 mg by mouth every evening, Disp: , Rfl:     pantoprazole (PROTONIX) 40 mg tablet, Take 1 tablet by mouth, Disp: , Rfl:     Past Medical History:   Diagnosis Date    [address] [address] [facility] [address] )     Intracerebral hemorrhage ([facility] [address] )     Premature birth     Seizures ([facility] [address] )     last seizure sept 2017       Past Surgical History:   Procedure Laterality Date    ACHILLES TENDON REPAIR ruptured, length of tendon, no rupture    BRAIN SURGERY      EYE SURGERY      AK COLONOSCOPY FLX DX W/COLLJ SPEC WHEN PFRMD N/A 11/16/2017    Procedure: EGD AND COLONOSCOPY;  Surgeon: Mikhail Durbin MD;  [facility]; Service: Gastroenterology    TONSILLECTOMY      with adenoidectomy    VAGAL NERVE STIMULATOR (VNS) PLACEMENT      by incision       Family History   Problem Relation Age of Onset    Ovarian cancer Mother         reports that she has never smoked  She has never used smokeless tobacco  She reports that she does not drink alcohol or use drugs        Physical Exam:    /70 (BP Location: Left arm, Patient Position: Sitting, Cuff Size: Standard)   Pulse 73   Temp (!) 97 3 °F (36 3 °C) (Tympanic)   Ht 5' 3" (1 6 m)   Wt 66 7 kg (147 lb)   BMI 26 04 kg/m²      Gen: wn/wd, NAD  HEENT: anicteric, MMM, no cervical LAD  CVS: RRR, no m/r/g  CHEST: CTA b/l  ABD: +BS, soft, NT,ND, no hepatosplenomegaly  EXT: no c/c/e  NEURO: aaox3  SKIN: NO rashes

## 2018-07-24 NOTE — PROGRESS NOTES
[facility]  Carmen Li 34 y o  female MRN: [id number]            Assessment & Plan:    Pleasant 53-year-old female with Crohn's disease primary of her ileum with scattered colonic disease as well, had adverse effects to medications which started at the same time including pantoprazole, budesonide, Pentasa  She had complained of arthralgias and she was switched over to sulfasalazine  1   Crohn's disease of small and large intestine without complications:  -clinically much improved and doing very well, continue sulfasalazine  -we will check inflammatory markers, sed rate, CRP, BMP at this time  -we will have the patient follow up in 2 months time, at that time we will get her set up for repeat colonoscopy to evaluate for mucosal healing  -continue sulfasalazine otherwise  -patient was instructed to give us a call with any change in symptoms or flare-up  -instructed to continue to avoid NSAIDs      _____________________________________________________________        CC: Follow-up of Crohn's disease    HPI:  Carmen Li is a 34 y  o female who is here for follow-up of Crohn's disease, primarily in her ileum but also scattered ulcerations throughout her colon  Initially was treated with Pentasa and budesonide, the patient reported side effects notably arthralgias with these medications  Her last visit weeks which were sulfasalazine  She reports that she is doing much better, her back pain has become much less frequent, usually low back and radiates upwards  She does not take any NSAID the time  Her bowel movements are significantly improved, she had 1 formed stool daily  Denies any abdominal pain, bloating, nausea, vomiting, diarrhea, melena, rectal bleeding  Her weight has been stable  She denies any other significant arthralgias      Unclear if her initial symptoms of side effects were due to the PPI therapy Pentasa budesonide but overall she is feeling much better at this time     Since her last visit she had laboratory studies which demonstrated some improvement in her inflammatory markers including fecal calprotectin however there is still elevated  Patient recently started working dietary services at [employer]  ROS:  The remainder of the ROS was negative except for the pertinent positives mentioned in HPI  Allergies: Gluten meal; Lac bovis; and Wheat bran    Medications:   Current Outpatient Prescriptions:     ferrous sulfate (KIM-IN-SOL) 75 (15 Fe) mg/mL drops, Take by mouth, Disp: , Rfl:     ferrous sulfate 325 (65 Fe) mg tablet, Take 1 tablet by mouth Twice daily, Disp: , Rfl:     lamoTRIgine (LaMICtal) 200 MG tablet, Take 100 mg by mouth 2 (two) times a day, Disp: , Rfl:     sulfaSALAzine (AZULFIDINE-EN) 500 mg EC tablet, Take 2 tablets (1,000 mg total) by mouth 2 (two) times a day, Disp: 6000 tablet, Rfl: 3    zonisamide (ZONEGRAN) 100 mg capsule, Take 50 mg by mouth 2 (two) times a day, Disp: , Rfl:     levocetirizine (XYZAL) 5 MG tablet, Take 5 mg by mouth every evening, Disp: , Rfl:     pantoprazole (PROTONIX) 40 mg tablet, Take 1 tablet by mouth, Disp: , Rfl:     Past Medical History:   Diagnosis Date    [facility] [address] )     Intracerebral hemorrhage ([address] )     Premature birth     Seizures ([facility] [address] )     last seizure sept 2017       Past Surgical History:   Procedure Laterality Date    ACHILLES TENDON REPAIR      ruptured, length of tendon, no rupture    BRAIN SURGERY      EYE SURGERY      AK COLONOSCOPY FLX DX W/COLLJ SPEC WHEN PFRMD N/A 11/16/2017    Procedure: EGD AND COLONOSCOPY;  Surgeon: David Cisneros MD;  [facility]; Service: Gastroenterology    TONSILLECTOMY      with adenoidectomy    VAGAL NERVE STIMULATOR (VNS) PLACEMENT      by incision       Family History   Problem Relation Age of Onset    Ovarian cancer Mother         reports that she has never smoked   She has never used smokeless tobacco  She reports that she does not drink alcohol or use drugs        Physical Exam:    /70 (BP Location: Left arm, Patient Position: Sitting, Cuff Size: Standard)   Pulse 73   Temp (!) 97 3 °F (36 3 °C) (Tympanic)   Ht 5' 3" (1 6 m)   Wt 66 7 kg (147 lb)   BMI 26 04 kg/m²     Gen: wn/wd, NAD  HEENT: anicteric, MMM, no cervical LAD  CVS: RRR, no m/r/g  CHEST: CTA b/l  ABD: +BS, soft, NT,ND, no hepatosplenomegaly  EXT: no c/c/e  NEURO: aaox3  SKIN: NO rashes

## 2018-07-31 ENCOUNTER — APPOINTMENT (OUTPATIENT)
Dept: LAB | Facility: CLINIC | Age: 28
End: 2018-07-31
Payer: COMMERCIAL

## 2018-07-31 DIAGNOSIS — K50.818 CROHN'S DISEASE OF BOTH SMALL AND LARGE INTESTINE WITH OTHER COMPLICATION (HCC): ICD-10-CM

## 2018-07-31 LAB
ANION GAP SERPL CALCULATED.3IONS-SCNC: 4 MMOL/L (ref 4–13)
BASOPHILS # BLD AUTO: 0.03 THOUSANDS/ΜL (ref 0–0.1)
BASOPHILS NFR BLD AUTO: 0 % (ref 0–1)
BUN SERPL-MCNC: 15 MG/DL (ref 5–25)
CALCIUM SERPL-MCNC: 8.3 MG/DL (ref 8.3–10.1)
CHLORIDE SERPL-SCNC: 103 MMOL/L (ref 100–108)
CO2 SERPL-SCNC: 31 MMOL/L (ref 21–32)
CREAT SERPL-MCNC: 0.84 MG/DL (ref 0.6–1.3)
CRP SERPL QL: 22.2 MG/L
EOSINOPHIL # BLD AUTO: 0.2 THOUSAND/ΜL (ref 0–0.61)
EOSINOPHIL NFR BLD AUTO: 3 % (ref 0–6)
ERYTHROCYTE [DISTWIDTH] IN BLOOD BY AUTOMATED COUNT: 13.7 % (ref 11.6–15.1)
ERYTHROCYTE [SEDIMENTATION RATE] IN BLOOD: 5 MM/HOUR (ref 0–20)
GFR SERPL CREATININE-BSD FRML MDRD: 96 ML/MIN/1.73SQ M
GLUCOSE P FAST SERPL-MCNC: 92 MG/DL (ref 65–99)
HCT VFR BLD AUTO: 37.8 % (ref 34.8–46.1)
HGB BLD-MCNC: 11.9 G/DL (ref 11.5–15.4)
IMM GRANULOCYTES # BLD AUTO: 0.01 THOUSAND/UL (ref 0–0.2)
IMM GRANULOCYTES NFR BLD AUTO: 0 % (ref 0–2)
LYMPHOCYTES # BLD AUTO: 1.16 THOUSANDS/ΜL (ref 0.6–4.47)
LYMPHOCYTES NFR BLD AUTO: 17 % (ref 14–44)
MCH RBC QN AUTO: 30.5 PG (ref 26.8–34.3)
MCHC RBC AUTO-ENTMCNC: 31.5 G/DL (ref 31.4–37.4)
MCV RBC AUTO: 97 FL (ref 82–98)
MONOCYTES # BLD AUTO: 0.85 THOUSAND/ΜL (ref 0.17–1.22)
MONOCYTES NFR BLD AUTO: 13 % (ref 4–12)
NEUTROPHILS # BLD AUTO: 4.42 THOUSANDS/ΜL (ref 1.85–7.62)
NEUTS SEG NFR BLD AUTO: 67 % (ref 43–75)
NRBC BLD AUTO-RTO: 0 /100 WBCS
PLATELET # BLD AUTO: 224 THOUSANDS/UL (ref 149–390)
PMV BLD AUTO: 7.6 FL (ref 8.9–12.7)
POTASSIUM SERPL-SCNC: 4.5 MMOL/L (ref 3.5–5.3)
RBC # BLD AUTO: 3.9 MILLION/UL (ref 3.81–5.12)
SODIUM SERPL-SCNC: 138 MMOL/L (ref 136–145)
WBC # BLD AUTO: 6.67 THOUSAND/UL (ref 4.31–10.16)

## 2018-07-31 PROCEDURE — 36415 COLL VENOUS BLD VENIPUNCTURE: CPT

## 2018-07-31 PROCEDURE — 80048 BASIC METABOLIC PNL TOTAL CA: CPT

## 2018-07-31 PROCEDURE — 85652 RBC SED RATE AUTOMATED: CPT

## 2018-07-31 PROCEDURE — 85025 COMPLETE CBC W/AUTO DIFF WBC: CPT

## 2018-07-31 PROCEDURE — 86140 C-REACTIVE PROTEIN: CPT

## 2018-08-02 ENCOUNTER — TELEPHONE (OUTPATIENT)
Dept: GASTROENTEROLOGY | Facility: AMBULARY SURGERY CENTER | Age: 28
End: 2018-08-02

## 2018-08-02 ENCOUNTER — APPOINTMENT (OUTPATIENT)
Dept: LAB | Facility: CLINIC | Age: 28
End: 2018-08-02
Payer: COMMERCIAL

## 2018-08-02 ENCOUNTER — TRANSCRIBE ORDERS (OUTPATIENT)
Dept: LAB | Facility: CLINIC | Age: 28
End: 2018-08-02

## 2018-08-02 DIAGNOSIS — K50.818 CROHN'S DISEASE OF BOTH SMALL AND LARGE INTESTINE WITH OTHER COMPLICATION (HCC): ICD-10-CM

## 2018-08-02 PROCEDURE — 83993 ASSAY FOR CALPROTECTIN FECAL: CPT

## 2018-08-02 NOTE — TELEPHONE ENCOUNTER
----- Message from Mitesh Sinclair MD sent at 8/1/2018  1:05 PM EDT -----  Please inform the patient that her laboratory studies look pretty good including her kidney function  Unfortunately her CRP is elevated  This may suggest ongoing inflammation  Make sure she has an appointment to follow up with me in about 2 months time, at that time we will schedule a colonoscopy if needed  Please have her call with any change in symptoms in the meantime

## 2018-08-08 LAB — CALPROTECTIN STL-MCNT: 264 UG/G (ref 0–120)

## 2018-08-09 ENCOUNTER — TELEPHONE (OUTPATIENT)
Dept: GASTROENTEROLOGY | Facility: AMBULARY SURGERY CENTER | Age: 28
End: 2018-08-09

## 2018-08-09 NOTE — TELEPHONE ENCOUNTER
----- Message from Umang Fang MD sent at 8/9/2018  9:17 AM EDT -----  Please inform the patient that her inflammatory markers in her stool or still elevated  Will see her at upcoming office visit, she will likely need to schedule colonoscopy  If you would rather prefer week guided schedule colonoscopy at this time to evaluate her inflammation  She will likely need to be placed on stronger medications  Please have the patient call with any questions or concerns

## 2018-09-05 ENCOUNTER — TELEPHONE (OUTPATIENT)
Dept: GASTROENTEROLOGY | Facility: CLINIC | Age: 28
End: 2018-09-05

## 2018-09-05 DIAGNOSIS — K50.80 CROHN'S DISEASE OF BOTH SMALL AND LARGE INTESTINE WITHOUT COMPLICATION (HCC): ICD-10-CM

## 2018-09-05 RX ORDER — SULFASALAZINE 500 MG/1
1000 TABLET, DELAYED RELEASE ORAL 2 TIMES DAILY
Qty: 120 TABLET | Refills: 0 | Status: SHIPPED | OUTPATIENT
Start: 2018-09-05 | End: 2018-09-10 | Stop reason: SDUPTHER

## 2018-09-05 NOTE — TELEPHONE ENCOUNTER
hailey patient      She has f/u visit on 9-20-18 but will not have enough medication until then  She would like at least a 10 day supply of sulfa salazine called I until she can be seen in the office

## 2018-09-10 DIAGNOSIS — K50.80 CROHN'S DISEASE OF BOTH SMALL AND LARGE INTESTINE WITHOUT COMPLICATION (HCC): ICD-10-CM

## 2018-09-10 RX ORDER — SULFASALAZINE 500 MG/1
1000 TABLET, DELAYED RELEASE ORAL 2 TIMES DAILY
Qty: 120 TABLET | Refills: 6 | Status: SHIPPED | OUTPATIENT
Start: 2018-09-10 | End: 2018-10-16 | Stop reason: SDUPTHER

## 2018-09-10 NOTE — TELEPHONE ENCOUNTER
Dr Manpreet Noguera pt    Per the pharmacy, the pt need a refill for Sulfasalazine 500mg   Please send to [facility] phone# [phone]

## 2018-09-20 ENCOUNTER — OFFICE VISIT (OUTPATIENT)
Dept: GASTROENTEROLOGY | Facility: AMBULARY SURGERY CENTER | Age: 28
End: 2018-09-20
Payer: COMMERCIAL

## 2018-09-20 VITALS
BODY MASS INDEX: 26.75 KG/M2 | SYSTOLIC BLOOD PRESSURE: 118 MMHG | WEIGHT: 151 LBS | HEART RATE: 76 BPM | TEMPERATURE: 97.6 F | HEIGHT: 63 IN | DIASTOLIC BLOOD PRESSURE: 68 MMHG

## 2018-09-20 DIAGNOSIS — K50.80 CROHN'S DISEASE OF BOTH SMALL AND LARGE INTESTINE WITHOUT COMPLICATION (HCC): Primary | ICD-10-CM

## 2018-09-20 PROBLEM — K50.819 CROHN'S DISEASE OF SMALL AND LARGE INTESTINES WITH COMPLICATION (HCC): Status: ACTIVE | Noted: 2018-09-20

## 2018-09-20 PROCEDURE — 99214 OFFICE O/P EST MOD 30 MIN: CPT | Performed by: PHYSICIAN ASSISTANT

## 2018-09-20 NOTE — PROGRESS NOTES
[facility] - Outpatient Follow-up Note  Hank Hinojosa 34 y o  female MRN: [id number]  Encounter: [id number]      ASSESSMENT AND PLAN:      Crohn's disease of small and large intestine without complications:  -clinically improved on sulfasalazine however her inflammatory markers are elevated (fecal calprotectin 264, CRP 22 2 ESR 5)  -We will get her set up for repeat colonoscopy to evaluate for active inflammation   -She will likely require escalation of therapy discussed remicade and humira with her-she states she would likely favor remicade, she had a negative quantiferon gold and hepatitis B serologies 12/2017  - continue to avoid NSAIDs  ______________________________________________________________________    SUBJECTIVEBriglalito Griffin feels well  She has 1-2 formed bowel movements daily without blood  She has intermittent LLQ discomfort after having a bm that resolved on its own  She denies nausea, vomiting  She has a good appetite and denies weight loss  She is compliant with the sulfasalazine unfortunately her inflammatory markers were found to be elevated  REVIEW OF SYSTEMS IS OTHERWISE NEGATIVE  Historical Information   Past Medical History:   Diagnosis Date    [address] [address] [address]     [facility]tracerebral hemorrhage [address] [address] [facility]     Premature birth     Seizures ([facility] [address] [facility]     last seizure sept 2017     Past Surgical History:   Procedure Laterality Date    ACHILLES TENDON REPAIR      ruptured, length of tendon, no rupture    BRAIN SURGERY      EYE SURGERY      MN COLONOSCOPY FLX DX W/COLLJ SPEC WHEN PFRMD N/A 11/16/2017    Procedure: EGD AND COLONOSCOPY;  Surgeon: Rashard Barroso MD;  [facility];   Service: Gastroenterology    TONSILLECTOMY      with adenoidectomy    VAGAL NERVE STIMULATOR (VNS) PLACEMENT      by incision     Social History   History   Alcohol Use No     History   Drug Use No     History   Smoking Status    Never Smoker   Smokeless Tobacco    Never Used     Family History   Problem Relation Age of Onset    Ovarian cancer Mother        Meds/Allergies       Current Outpatient Prescriptions:     ferrous sulfate 325 (65 Fe) mg tablet    lamoTRIgine (LaMICtal) 200 MG tablet    levocetirizine (XYZAL) 5 MG tablet    sulfaSALAzine (AZULFIDINE-EN) 500 mg EC tablet    ferrous sulfate (KIM-IN-SOL) 75 (15 Fe) mg/mL drops    pantoprazole (PROTONIX) 40 mg tablet    zonisamide (ZONEGRAN) 100 mg capsule    Allergies   Allergen Reactions    Gluten Meal     Lac Bovis     Wheat Bran            Objective     Blood pressure 118/68, pulse 76, temperature 97 6 °F (36 4 °C), temperature source Tympanic, height 5' 3" (1 6 m), weight 68 5 kg (151 lb)  Body mass index is 26 75 kg/m²  PHYSICAL EXAM:      General Appearance:   Alert, cooperative, no distress   HEENT:   Normocephalic, atraumatic, anicteric      Neck:  Supple, symmetrical, trachea midline   Lungs:   Clear to auscultation bilaterally   Heart[de-identified]   Regular rate and rhythm   Abdomen:   Soft, non-tender, non-distended; normal bowel sounds   Genitalia:   Deferred    Rectal:   Deferred    Extremities:  No cyanosis, clubbing or edema    Pulses:  2+ and symmetric    Skin:  No jaundice, rashes, or lesions    Lymph nodes:  No palpable cervical lymphadenopathy        Lab Results:   No visits with results within 1 Day(s) from this visit  Latest known visit with results is:   Appointment on 08/02/2018   Component Date Value    Calprotectin 08/02/2018 264*     Radiology Results:   No results found

## 2018-10-01 ENCOUNTER — ANESTHESIA EVENT (OUTPATIENT)
Dept: PERIOP | Facility: AMBULARY SURGERY CENTER | Age: 28
End: 2018-10-01
Payer: COMMERCIAL

## 2018-10-08 ENCOUNTER — TELEPHONE (OUTPATIENT)
Dept: GASTROENTEROLOGY | Facility: CLINIC | Age: 28
End: 2018-10-08

## 2018-10-08 DIAGNOSIS — K50.80 CROHN'S DISEASE OF BOTH SMALL AND LARGE INTESTINE WITHOUT COMPLICATION (HCC): ICD-10-CM

## 2018-10-10 ENCOUNTER — ANESTHESIA (OUTPATIENT)
Dept: PERIOP | Facility: AMBULARY SURGERY CENTER | Age: 28
End: 2018-10-10
Payer: COMMERCIAL

## 2018-10-10 ENCOUNTER — HOSPITAL ENCOUNTER (OUTPATIENT)
Facility: AMBULARY SURGERY CENTER | Age: 28
Setting detail: OUTPATIENT SURGERY
Discharge: HOME | End: 2018-10-10
Attending: INTERNAL MEDICINE | Admitting: INTERNAL MEDICINE
Payer: COMMERCIAL

## 2018-10-10 VITALS
OXYGEN SATURATION: 99 % | RESPIRATION RATE: 12 BRPM | HEART RATE: 65 BPM | WEIGHT: 148 LBS | BODY MASS INDEX: 26.22 KG/M2 | HEIGHT: 63 IN | DIASTOLIC BLOOD PRESSURE: 60 MMHG | TEMPERATURE: 96.9 F | SYSTOLIC BLOOD PRESSURE: 107 MMHG

## 2018-10-10 DIAGNOSIS — K50.819 CROHN'S DISEASE OF SMALL AND LARGE INTESTINES WITH COMPLICATION (HCC): ICD-10-CM

## 2018-10-10 LAB — EXT PREGNANCY TEST URINE: NEGATIVE

## 2018-10-10 PROCEDURE — 88305 TISSUE EXAM BY PATHOLOGIST: CPT | Performed by: PATHOLOGY

## 2018-10-10 PROCEDURE — 81025 URINE PREGNANCY TEST: CPT | Performed by: ANESTHESIOLOGY

## 2018-10-10 PROCEDURE — 45380 COLONOSCOPY AND BIOPSY: CPT | Performed by: INTERNAL MEDICINE

## 2018-10-10 RX ORDER — PROPOFOL 10 MG/ML
INJECTION, EMULSION INTRAVENOUS AS NEEDED
Status: DISCONTINUED | OUTPATIENT
Start: 2018-10-10 | End: 2018-10-10 | Stop reason: SURG

## 2018-10-10 RX ORDER — SODIUM CHLORIDE 9 MG/ML
125 INJECTION, SOLUTION INTRAVENOUS CONTINUOUS
Status: DISCONTINUED | OUTPATIENT
Start: 2018-10-10 | End: 2018-10-10 | Stop reason: HOSPADM

## 2018-10-10 RX ADMIN — PROPOFOL 30 MG: 10 INJECTION, EMULSION INTRAVENOUS at 11:33

## 2018-10-10 RX ADMIN — SODIUM CHLORIDE: 0.9 INJECTION, SOLUTION INTRAVENOUS at 11:14

## 2018-10-10 RX ADMIN — PROPOFOL 30 MG: 10 INJECTION, EMULSION INTRAVENOUS at 11:28

## 2018-10-10 RX ADMIN — PROPOFOL 30 MG: 10 INJECTION, EMULSION INTRAVENOUS at 11:24

## 2018-10-10 RX ADMIN — PROPOFOL 30 MG: 10 INJECTION, EMULSION INTRAVENOUS at 11:22

## 2018-10-10 RX ADMIN — PROPOFOL 30 MG: 10 INJECTION, EMULSION INTRAVENOUS at 11:26

## 2018-10-10 RX ADMIN — PROPOFOL 30 MG: 10 INJECTION, EMULSION INTRAVENOUS at 11:20

## 2018-10-10 RX ADMIN — PROPOFOL 30 MG: 10 INJECTION, EMULSION INTRAVENOUS at 11:35

## 2018-10-10 RX ADMIN — PROPOFOL 100 MG: 10 INJECTION, EMULSION INTRAVENOUS at 11:18

## 2018-10-10 RX ADMIN — PROPOFOL 30 MG: 10 INJECTION, EMULSION INTRAVENOUS at 11:31

## 2018-10-10 NOTE — OP NOTE
Colonoscopy Procedure Note    Procedure: Colonoscopy    Sedation: Monitored anesthesia care, check anesthesia records      ASA Class: 2    INDICATIONS:  Crohn's disease    POST-OP DIAGNOSIS: See the impression below    Procedure Details     Prior colonoscopy: Less than 3 years ago  It is being repeated at an interval of less than 3 years because: This colonoscopy is being performed for a diagnostic indication    Informed consent was obtained for the procedure, including sedation  Risks of perforation, hemorrhage, adverse drug reaction and aspiration were discussed  The patient was placed in the left lateral decubitus position  Based on the pre-procedure assessment, including review of the patient's medical history, medications, allergies, and review of systems, she had been deemed to be an appropriate candidate for conscious sedation; she was therefore sedated with the medications listed below  The patient was monitored continuously with telemetry, pulse oximetry, blood pressure monitoring, and direct observations  A rectal examination was performed  The colonoscope was inserted into the rectum and advanced under direct vision to the terminal ileum  The quality of the colonic preparation was good  A careful inspection was made as the colonoscope was withdrawn, including a retroflexed view of the rectum; findings and interventions are described below  Findings:    Active ileitis with long areas of ulcerations, there also some suggestions of stenosis in this area  Multiple biopsies were taken  Ileal ulcerations extended 15 to 20 cm into the terminal ileum  The remainder of the colon was normal with no evidence of colonic disease  Complications: None; patient tolerated the procedure well  Impression:    Moderate to severe ileitis with linear ulcerations, mild stenosis, edematous and friable mucosa    Otherwise normal colon    Recommendations:    Discharge home  Resume regular diet  Resume home medications  Follow up biopsy results  Likely recommend escalation of therapy, consider biologic agents  Call with any abdominal pain, bleeding, fevers

## 2018-10-10 NOTE — ANESTHESIA POSTPROCEDURE EVALUATION
Post-Op Assessment Note      CV Status:  Stable    Mental Status:  Alert and awake    Hydration Status:  Euvolemic    PONV Controlled:  Controlled    Airway Patency:  Patent    Post Op Vitals Reviewed: Yes          Staff: AnesthesiologistJOSE L           BP 96/53 (10/10/18 1142)    Temp (!) 96 9 °F (36 1 °C) (10/10/18 1142)    Pulse 73 (10/10/18 1142)   Resp 21 (10/10/18 1142)    SpO2 95 % (10/10/18 1142)

## 2018-10-10 NOTE — ANESTHESIA PREPROCEDURE EVALUATION
Review of Systems/Medical History  Patient summary reviewed  Chart reviewed  No history of anesthetic complications     Cardiovascular  Negative cardio ROS    Pulmonary  Negative pulmonary ROS        GI/Hepatic    Bowel prep  Comment: crohns     Negative  ROS        Endo/Other  Negative endo/other ROS      GYN  Not currently pregnant ,          Hematology  Negative hematology ROS      Musculoskeletal  Negative musculoskeletal ROS        Neurology  Seizures well controlled,  Neuromuscular disease , cerebral palsy,   Comment: S/P sz surgery, has implant  Last isolated sz was 09/17 Psychology   Negative psychology ROS              Physical Exam    Airway    Mallampati score: I  TM Distance: >3 FB  Neck ROM: full     Dental   No notable dental hx     Cardiovascular  Comment: Negative ROS,     Pulmonary      Other Findings        Anesthesia Plan  ASA Score- 3     Anesthesia Type- IV sedation with anesthesia with ASA Monitors  Additional Monitors:   Airway Plan:     Comment: IV sedation,  standard ASA monitors  Risks and benefits discussed with patient; patient consented and agrees to proceed  I saw and evaluated the patient  If seen with CRNA, we have discussed the anesthetic plan and I am in agreement that the plan is appropriate for the patient  upt neg 10/10/18  Plan Factors-    Induction- intravenous  Postoperative Plan-     Informed Consent- Anesthetic plan and risks discussed with patient  I personally reviewed this patient with the CRNA  Discussed and agreed on the Anesthesia Plan with the CRNA  Joseph Duran

## 2018-10-10 NOTE — H&P
History and Physical -  [facility]  Denis Magdaleno 34 y o  female MRN: [id number]    HPI: Denis Magdaleno is a 34y o  year old female who presents with hx of crohns disease      Review of Systems    Historical Information   Past Medical History:   Diagnosis Date    Cerebral palsy ([address]     [facility]rebral hemorrhage [address] [address]     Premature birth     Seizures ([facility] [address] )     last seizure sept 2017     Past Surgical History:   Procedure Laterality Date    ACHILLES TENDON REPAIR      ruptured, length of tendon, no rupture    BRAIN SURGERY      EYE SURGERY      TX COLONOSCOPY FLX DX W/COLLJ SPEC WHEN PFRMD N/A 11/16/2017    Procedure: EGD AND COLONOSCOPY;  Surgeon: Kyra Rider MD;  [facility]; Service: Gastroenterology    TONSILLECTOMY      with adenoidectomy    VAGAL NERVE STIMULATOR (VNS) PLACEMENT      by incision     Social History   History   Alcohol Use No     History   Drug Use No     History   Smoking Status    Never Smoker   Smokeless Tobacco    Never Used     Family History   Problem Relation Age of Onset    Ovarian cancer Mother        Meds/Allergies     Prescriptions Prior to Admission   Medication    ferrous sulfate (KIM-IN-SOL) 75 (15 Fe) mg/mL drops    ferrous sulfate 325 (65 Fe) mg tablet    lamoTRIgine (LaMICtal) 200 MG tablet    sulfaSALAzine (AZULFIDINE-EN) 500 mg EC tablet       Allergies   Allergen Reactions    Gluten Meal     Lac Bovis     Wheat Bran        Objective     Blood pressure 114/66, pulse 77, temperature 98 2 °F (36 8 °C), temperature source Temporal, resp  rate 16, height 5' 3" (1 6 m), weight 67 1 kg (148 lb), SpO2 98 %        PHYSICAL EXAM    Gen: NAD  CV: RRR  CHEST: Clear  ABD: soft, NT/ND  EXT: no edema  Neuro: AAO      ASSESSMENT/PLAN:  This is a 34y o  year old female here for hx of crohns disease      PLAN:   Procedure: colonoscopy

## 2018-10-10 NOTE — DISCHARGE INSTRUCTIONS
Discharge home  Resume regular diet  Resume home medications  Follow up biopsy results  Likely recommend escalation of therapy, consider biologic agents  Call with any abdominal pain, bleeding, fevers

## 2018-10-16 RX ORDER — SULFASALAZINE 500 MG/1
1000 TABLET, DELAYED RELEASE ORAL 2 TIMES DAILY
Qty: 120 TABLET | Refills: 5 | Status: SHIPPED | OUTPATIENT
Start: 2018-10-16 | End: 2019-11-06 | Stop reason: SDUPTHER

## 2018-10-16 NOTE — TELEPHONE ENCOUNTER
Sent sulfasalazine  Discussed with patient  She reports that Dr Todd Franks had mentioned possibly doing a capsule endoscopy after her last colonoscopy  I do not see this in his note at all  Did you want this patient to have any further procedures prior to changing her medications?   It appears that she had a CT enterography previously but is unable to get an MRI

## 2018-10-16 NOTE — TELEPHONE ENCOUNTER
No need for capsule right now  Can you please overbook her with me ASAP to discuss treatment options

## 2018-10-16 NOTE — TELEPHONE ENCOUNTER
Pt called requesting a refill of sulfasalazine 500mg  [facility] pharmacy [phone]  Pt also requesting to schedule an egd   Please assist thank you

## 2018-10-17 ENCOUNTER — TELEPHONE (OUTPATIENT)
Dept: GASTROENTEROLOGY | Facility: CLINIC | Age: 28
End: 2018-10-17

## 2018-10-17 NOTE — TELEPHONE ENCOUNTER
----- Message from Radha Weathers MD sent at 10/15/2018 12:33 PM EDT -----  Please inform the patient that biopsies from her recent colonoscopy continue show changes of Crohn's disease in her terminal ileum  Please make sure that she has an office follow-up with me in the next 3 to 4 weeks to discuss treatment options  Okay to over book  Please have her call with any questions or concerns

## 2018-10-18 NOTE — TELEPHONE ENCOUNTER
Spoke to the patient and gave results   Patient has appt on 11/06/2018 @ [address] [facility]

## 2018-11-06 ENCOUNTER — OFFICE VISIT (OUTPATIENT)
Dept: GASTROENTEROLOGY | Facility: AMBULARY SURGERY CENTER | Age: 28
End: 2018-11-06
Payer: COMMERCIAL

## 2018-11-06 VITALS
BODY MASS INDEX: 27.57 KG/M2 | HEART RATE: 72 BPM | TEMPERATURE: 97.5 F | HEIGHT: 63 IN | WEIGHT: 155.6 LBS | SYSTOLIC BLOOD PRESSURE: 116 MMHG | DIASTOLIC BLOOD PRESSURE: 70 MMHG

## 2018-11-06 DIAGNOSIS — K50.80 CROHN'S DISEASE OF BOTH SMALL AND LARGE INTESTINE WITHOUT COMPLICATION (HCC): Primary | ICD-10-CM

## 2018-11-06 PROCEDURE — 99213 OFFICE O/P EST LOW 20 MIN: CPT | Performed by: INTERNAL MEDICINE

## 2018-11-06 RX ORDER — BUDESONIDE 3 MG/1
9 CAPSULE, COATED PELLETS ORAL DAILY
Qty: 90 CAPSULE | Refills: 6 | Status: SHIPPED | OUTPATIENT
Start: 2018-11-06

## 2018-11-06 NOTE — PROGRESS NOTES
[facility]  Yasmani Howard 34 y o  female MRN: [id number]            Assessment & Plan:    Pleasant 57-year-old female with Crohn's disease, initially presented with colonic and ileal disease, currently on sulfasalazine  1   Ileocolonic Crohn's disease:  Currently on sulfasalazine with continued elevation of fecal calprotectin and ileal ulcerations however clinically and symptomatically patient is doing very well  -discussed with the patient that options include increasing therapeutic medications including immunosuppressive medications, biologic agents  -given that she is relatively asymptomatic and clinically doing much better suggest that we start with another challenge of budesonide   -there is a possibility that she had side effects in the past with budesonide including headaches and joint pains, she was instructed to give us a call with any recurrent side effects  -we will see her back in 2 to 3 months, if she is tolerating budesonide we can do a 3 month count budesonide and then taper thereafter  -at that point in 2 months would recommend checking sed rate, CRP, fecal calprotectin, CBC and BMP a  -if she has any worsening symptoms or increasing luminal symptoms we will consider imaging studies and likely about point would moved to a biologic agent such as Entyvio or Remicade  -we will have her come back in 2 to 3 months to discuss, hopefully she will tolerate budesonide we will continue this for 3 potentially 6 months        _____________________________________________________________        CC: Follow-up of Crohn's disease    HPI:  Yasmani Howard is a 34 y  o female who is here for follow-up of Crohn's disease  As you know this is a 57-year-old female initially presented with diarrhea and dysphagia found to have Crohn disease with ulcers in her colon and ileum  Initially placed on Pentasa and budesonide, she had significant side effects and stopped her medications    Ultimately we started her on sulfasalazine about 6 months ago and she has been gradually improving  Her inflammatory markers have improved however her fecal calprotectin was continuing to increase, her last colonoscopy demonstrates resolution of the colonic ulcerations, there is still ileal ulceration and inflammation  Patient reports that since her colonoscopy she has been doing well, relative stable with 1 to 2 formed bowel movements daily, she has left lower quadrant abdominal pain after bowel movement which seems to resolve on its own  Her appetite is good, her weight has been stable  She denies any arthralgias or ocular symptoms  She reports compliance with her sulfasalazine  Denies any NSAIDs  ROS:  The remainder of the ROS was negative except for the pertinent positives mentioned in HPI  Allergies: Gluten meal; Lac bovis; and Wheat bran    Medications:   Current Outpatient Prescriptions:     ferrous sulfate 325 (65 Fe) mg tablet, Take 1 tablet by mouth Twice daily, Disp: , Rfl:     lamoTRIgine (LaMICtal) 200 MG tablet, Take 100 mg by mouth 2 (two) times a day, Disp: , Rfl:     sulfaSALAzine (AZULFIDINE-EN) 500 mg EC tablet, Take 2 tablets (1,000 mg total) by mouth 2 (two) times a day, Disp: 120 tablet, Rfl: 5    budesonide (ENTOCORT EC) 3 MG capsule, Take 3 capsules (9 mg total) by mouth daily, Disp: 90 capsule, Rfl: 6    ferrous sulfate (KIM-IN-SOL) 75 (15 Fe) mg/mL drops, Take by mouth, Disp: , Rfl:     Past Medical History:   Diagnosis Date    Cerebral palsy ([address]     Intracerebral hemorrhage ([address]     Premature birth     Seizures ([facility] [address] [facility]     last seizure sept 2017       Past Surgical History:   Procedure Laterality Date    ACHILLES TENDON REPAIR      ruptured, length of tendon, no rupture    BRAIN SURGERY      EYE SURGERY      MT COLONOSCOPY FLX DX W/COLLJ SPEC WHEN PFRMD N/A 11/16/2017    Procedure: EGD AND COLONOSCOPY;  Surgeon: Faye Butcher MD;  [facility];   Service: Gastroenterology    [place] COLONOSCOPY FLX DX W/COLLJ SPEC WHEN PFRMD N/A 10/10/2018    Procedure: COLONOSCOPY;  Surgeon: Ish Warner MD;  [facility]; Service: Gastroenterology    TONSILLECTOMY      with adenoidectomy    VAGAL NERVE STIMULATOR (VNS) PLACEMENT      by incision       Family History   Problem Relation Age of Onset    Ovarian cancer Mother         reports that she has never smoked  She has never used smokeless tobacco  She reports that she does not drink alcohol or use drugs        Physical Exam:    /70 (BP Location: Left arm, Patient Position: Sitting, Cuff Size: Standard)   Pulse 72   Temp 97 5 °F (36 4 °C) (Tympanic)   Ht 5' 3" (1 6 m)   Wt 70 6 kg (155 lb 9 6 oz)   BMI 27 56 kg/m²     Gen: wn/wd, NAD, healthy-appearing female  HEENT: anicteric, MMM, no cervical LAD  CVS: RRR, no m/r/g  CHEST: CTA b/l  ABD: +BS, soft, NT,ND, no hepatosplenomegaly  EXT: no c/c/e  NEURO: aaox3  SKIN: NO rashes

## 2018-11-13 ENCOUNTER — TELEPHONE (OUTPATIENT)
Dept: GASTROENTEROLOGY | Facility: AMBULARY SURGERY CENTER | Age: 28
End: 2018-11-13

## 2018-11-13 NOTE — TELEPHONE ENCOUNTER
DR Stella Fuller PT    Pt called to notify the doctor that she completely stop the budesonide because of spinal pain, pt also requesting to schedule the follow up procedures that were recommended after her colonoscopy

## 2018-11-14 NOTE — TELEPHONE ENCOUNTER
Hi Dr Anamaria Bone pt stopped budesonide    Also, what procedure was ordered after her colonoscopy? ?

## 2019-09-28 NOTE — TELEPHONE ENCOUNTER
Please offer pt: 10/25 at 230p with Dr Govind Corey at [facility]   Please offer waitlist as well

## 2019-10-04 NOTE — TELEPHONE ENCOUNTER
Pt is unable to make the appt on 10/25   Please assist in r/s the appt to a Tuesday or thursday [phone]

## 2019-10-08 ENCOUNTER — OFFICE VISIT (OUTPATIENT)
Dept: GASTROENTEROLOGY | Facility: AMBULARY SURGERY CENTER | Age: 29
End: 2019-10-08
Payer: COMMERCIAL

## 2019-10-08 VITALS
WEIGHT: 164 LBS | SYSTOLIC BLOOD PRESSURE: 110 MMHG | HEIGHT: 63 IN | BODY MASS INDEX: 29.06 KG/M2 | RESPIRATION RATE: 14 BRPM | HEART RATE: 94 BPM | DIASTOLIC BLOOD PRESSURE: 70 MMHG | TEMPERATURE: 98.1 F

## 2019-10-08 DIAGNOSIS — K50.80 CROHN'S DISEASE OF BOTH SMALL AND LARGE INTESTINE WITHOUT COMPLICATION (HCC): Primary | ICD-10-CM

## 2019-10-08 PROCEDURE — 99214 OFFICE O/P EST MOD 30 MIN: CPT | Performed by: PHYSICIAN ASSISTANT

## 2019-10-08 NOTE — PROGRESS NOTES
Follow-up Note -  [facility]  Elmira Mathias 1990 34 y o  female         Reason:  Follow-up; ileocolonic Crohn's disease    HPI:  21-year-old female with history of cerebral palsy and Crohn's disease which she says was diagnosed a few years ago who presents for follow-up; she was last seen in the office nearly a year ago with Dr Jose Diaz in November 2018, at that time reporting to be doing well clinically on sulfasalazine for maintenance; her last colonoscopy had been a month earlier which did show ongoing ileal ulceration and inflammation, although some improvement in colonic inflammation  She had a chronically elevated fecal calprotectin  According to the notes she was advised to try budesonide, follow-up in 2-3 months, at which point a taper could be initiated if she was still doing well  It appears the patient did not follow-up until this point, she says she never did start budesonide  She says she continues to take sulfasalazine  She says she actually reports feeling relatively well, she is having a bowel movement about once or twice a day on average, stool consistency varies but typically is formed  She denies any blood or mucus in the stools or any melena  She denies any abdominal pain, any nausea or vomiting, any difficulties with swallowing, any unexpected changes in her weight, or any problems with acid reflux or heartburn  REVIEW OF SYSTEMS:      CONSTITUTIONAL: Denies any fever, chills, or rigors  Good appetite, and no recent weight loss  HEENT: No earache or tinnitus  Denies hearing loss or visual disturbances  CARDIOVASCULAR: No chest pain or palpitations  RESPIRATORY: Denies any cough, hemoptysis, shortness of breath or dyspnea on exertion  GASTROINTESTINAL: As noted in the History of Present Illness  GENITOURINARY: No problems with urination  Denies any hematuria or dysuria  NEUROLOGIC: No dizziness or vertigo, denies headaches     MUSCULOSKELETAL: Denies any muscle or joint pain  SKIN: Denies skin rashes or itching  ENDOCRINE: Denies excessive thirst  Denies intolerance to heat or cold  PSYCHOSOCIAL: Denies depression or anxiety  Denies any recent memory loss  Past Medical History:   Diagnosis Date    [facility]     [facility]rebral hemorrhage [facility]     Premature birth     Seizures ([facility] )     last seizure sept 2017      Past Surgical History:   Procedure Laterality Date    ACHILLES TENDON REPAIR      ruptured, length of tendon, no rupture    BRAIN SURGERY      EYE SURGERY      [place] COLONOSCOPY FLX DX W/COLLJ SPEC WHEN PFRMD N/A 11/16/2017    Procedure: EGD AND COLONOSCOPY;  Surgeon: Fam May MD;  [facility]; Service: Gastroenterology    AR COLONOSCOPY FLX DX W/COLLJ SPEC WHEN PFRMD N/A 10/10/2018    Procedure: COLONOSCOPY;  Surgeon: Fam May MD;  [facility];   Service: Gastroenterology    TONSILLECTOMY      with adenoidectomy    VAGAL NERVE STIMULATOR (VNS) PLACEMENT      by incision     Social History     Socioeconomic History    Marital status: Single     Spouse name: Not on file    Number of children: Not on file    Years of education: Not on file    Highest education level: Not on file   Occupational History    Not on file   Social Needs    Financial resource strain: Not on file    Food insecurity:     Worry: Not on file     Inability: Not on file    Transportation needs:     Medical: Not on file     Non-medical: Not on file   Tobacco Use    Smoking status: Never Smoker    Smokeless tobacco: Never Used   Substance and Sexual Activity    Alcohol use: No    Drug use: No    Sexual activity: Not on file   Lifestyle    Physical activity:     Days per week: Not on file     Minutes per session: Not on file    Stress: Not on file   Relationships    Social connections:     Talks on phone: Not on file     Gets together: Not on file     Attends [employer] service: Not on file     Active member of club or organization: Not on file     Attends meetings of clubs or organizations: Not on file     Relationship status: Not on file    Intimate partner violence:     Fear of current or ex partner: Not on file     Emotionally abused: Not on file     Physically abused: Not on file     Forced sexual activity: Not on file   Other Topics Concern    Not on file   Social History Narrative    Not on file     Family History   Problem Relation Age of Onset    Ovarian cancer Mother      Gluten meal; Lac bovis; and Wheat bran  Current Outpatient Medications   Medication Sig Dispense Refill    sulfaSALAzine (AZULFIDINE-EN) 500 mg EC tablet Take 2 tablets (1,000 mg total) by mouth 2 (two) times a day 120 tablet 5    budesonide (ENTOCORT EC) 3 MG capsule Take 3 capsules (9 mg total) by mouth daily (Patient not taking: Reported on 10/8/2019) 90 capsule 6    ferrous sulfate (KIM-IN-SOL) 75 (15 Fe) mg/mL drops Take by mouth      ferrous sulfate 325 (65 Fe) mg tablet Take 1 tablet by mouth Twice daily      lamoTRIgine (LaMICtal) 200 MG tablet Take 100 mg by mouth 2 (two) times a day       No current facility-administered medications for this visit  Blood pressure 110/70, pulse 94, temperature 98 1 °F (36 7 °C), temperature source Tympanic, resp  rate 14, height 5' 3" (1 6 m), weight 74 4 kg (164 lb)  PHYSICAL EXAM:      General Appearance:   Alert, cooperative, no distress, appears stated age    HEENT:   Normocephalic, atraumatic, anicteric      Neck:  Supple, symmetrical, trachea midline, no adenopathy;    thyroid: no enlargement/tenderness/nodules; no carotid  bruit or JVD    Lungs:   Clear to auscultation bilaterally; no rales, rhonchi or wheezing; respirations unlabored    Heart[de-identified]   S1 and S2 normal; regular rate and rhythm; no murmur, rub, or gallop     Abdomen:   Soft, non-tender, non-distended; normal bowel sounds; no masses, no organomegaly    Extremities: No edema, erythema, wounds, rashes   Rectal:   Deferred  Lab Results   Component Value Date    WBC 6 67 07/31/2018    HGB 11 9 07/31/2018    HCT 37 8 07/31/2018    MCV 97 07/31/2018     07/31/2018     Lab Results   Component Value Date    GLUCOSE 83 03/02/2015    CALCIUM 8 3 07/31/2018     03/02/2015    K 4 5 07/31/2018    CO2 31 07/31/2018     07/31/2018    BUN 15 07/31/2018    CREATININE 0 84 07/31/2018     Lab Results   Component Value Date    ALT 22 05/18/2018    AST 19 05/18/2018    ALKPHOS 70 05/18/2018    BILITOT 0 2 03/02/2015     Lab Results   Component Value Date    INR 1 01 08/13/2014    PROTIME 13 1 08/13/2014       No results found  ASSESSMENT & PLAN:    Crohn's disease of both small and large intestine (HCC)  Clinically appears to be in remission on sulfasalazine for maintenance  Her last colonoscopy was done about a year ago and did show evidence of ongoing ileal inflammation/ulceration, she was advised to try budesonide at a follow-up office visit but it appears she never started taking this  Should assess for any ongoing low level inflammatory process which could potentially lead to complications later on (fistulizing disease, strictures, etc)    - We'll check inflammatory markers at this time, check sedimentation rate, CRP and recheck fecal calprotectin    - We'll also consider further imaging such as CT/MR enterography, particularly if her inflammatory markers are elevated    If there is found to be evidence of ongoing inflammatory process, she may need repeat colonoscopy and consideration for escalation of maintenance therapy to biologics/immunomodulators    - Check CBC and comprehensive metabolic panel    - I advised patient to call our office if she experiences significant symptoms such as abdominal pain, change bowel habits, blood or mucus in the stools, fevers, new rashes/joint pains, mouth sores, etc     - Continue sulfasalazine for now

## 2019-10-08 NOTE — ASSESSMENT & PLAN NOTE
Clinically appears to be in remission on sulfasalazine for maintenance  Her last colonoscopy was done about a year ago and did show evidence of ongoing ileal inflammation/ulceration, she was advised to try budesonide at a follow-up office visit but it appears she never started taking this  Should assess for any ongoing low level inflammatory process which could potentially lead to complications later on (fistulizing disease, strictures, etc)    - We'll check inflammatory markers at this time, check sedimentation rate, CRP and recheck fecal calprotectin    - We'll also consider further imaging such as CT/MR enterography, particularly if her inflammatory markers are elevated    If there is found to be evidence of ongoing inflammatory process, she may need repeat colonoscopy and consideration for escalation of maintenance therapy to biologics/immunomodulators    - Check CBC and comprehensive metabolic panel    - I advised patient to call our office if she experiences significant symptoms such as abdominal pain, change bowel habits, blood or mucus in the stools, fevers, new rashes/joint pains, mouth sores, etc     - Continue sulfasalazine for now

## 2019-11-06 DIAGNOSIS — K50.80 CROHN'S DISEASE OF BOTH SMALL AND LARGE INTESTINE WITHOUT COMPLICATION (HCC): ICD-10-CM

## 2019-11-06 RX ORDER — SULFASALAZINE 500 MG/1
TABLET, DELAYED RELEASE ORAL
Qty: 120 TABLET | Refills: 5 | Status: SHIPPED | OUTPATIENT
Start: 2019-11-06

## 2019-11-14 DIAGNOSIS — K50.819 CROHN'S DISEASE OF SMALL AND LARGE INTESTINES WITH COMPLICATION (HCC): ICD-10-CM

## 2019-11-14 DIAGNOSIS — K50.80 CROHN'S DISEASE OF BOTH SMALL AND LARGE INTESTINE WITHOUT COMPLICATION (HCC): Primary | ICD-10-CM

## 2019-11-15 ENCOUNTER — TELEPHONE (OUTPATIENT)
Dept: GASTROENTEROLOGY | Facility: AMBULARY SURGERY CENTER | Age: 29
End: 2019-11-15

## 2019-11-15 NOTE — TELEPHONE ENCOUNTER
----- Message from Jamie Brito MD sent at 11/14/2019  5:27 PM EST -----  I ordered MR enterography for patient, please schedule as soon as convenient       ----- Message -----  From: Frida Yao PA-C  Sent: 10/21/2019   3:36 PM EST  To: Jamie Brito MD, Frida Yao PA-C    Hi Dr Felix Heaton, I saw this patient in my office recently - she appeared to be doing well clinically but had apparently not taken budesonide after her last colonoscopy which was about a year ago and which did show some ongoing ileal inflammation  CRP is only mildly elevated and sed rate normal, but the fecal calprotectin is quite elevated in the 800s    I think we should evaluate the TI, just not sure if we should go ahead and schedule colonoscopy or possibly evaluate the small bowel with CT/MR enterography first   Thanks

## 2019-12-19 ENCOUNTER — TELEPHONE (OUTPATIENT)
Dept: GASTROENTEROLOGY | Facility: CLINIC | Age: 29
End: 2019-12-19

## 2019-12-19 NOTE — TELEPHONE ENCOUNTER
----- Message from Evangelina Mayberry PA-C sent at 12/18/2019 12:19 PM EST -----  Wanted to follow up on this, was MR enterography ever scheduled for this patient? Thanks    ----- Message -----  From: Alvaro Villareal MD  Sent: 11/14/2019   5:27 PM EST  To: Evangelina Mayberry PA-C, #    I ordered MR enterography for patient, please schedule as soon as convenient       ----- Message -----  From: Evangelina aMyberry PA-C  Sent: 10/21/2019   3:36 PM EST  To: Alvaro Villareal MD, Evangelina Mayberry PA-C    Hi Dr Michelle Hu, I saw this patient in my office recently - she appeared to be doing well clinically but had apparently not taken budesonide after her last colonoscopy which was about a year ago and which did show some ongoing ileal inflammation  CRP is only mildly elevated and sed rate normal, but the fecal calprotectin is quite elevated in the 800s    I think we should evaluate the TI, just not sure if we should go ahead and schedule colonoscopy or possibly evaluate the small bowel with CT/MR enterography first   Thanks

## 2021-04-14 ENCOUNTER — HOSPITAL ENCOUNTER (EMERGENCY)
Facility: HOSPITAL | Age: 31
Discharge: HOME | End: 2021-04-14
Attending: EMERGENCY MEDICINE
Payer: COMMERCIAL

## 2021-04-14 VITALS
HEIGHT: 63 IN | BODY MASS INDEX: 28.35 KG/M2 | DIASTOLIC BLOOD PRESSURE: 83 MMHG | OXYGEN SATURATION: 99 % | SYSTOLIC BLOOD PRESSURE: 114 MMHG | RESPIRATION RATE: 18 BRPM | TEMPERATURE: 98.2 F | HEART RATE: 65 BPM | WEIGHT: 160 LBS

## 2021-04-14 DIAGNOSIS — S61.411A LACERATION OF RIGHT HAND WITHOUT FOREIGN BODY, INITIAL ENCOUNTER: Primary | ICD-10-CM

## 2021-04-14 PROCEDURE — 99282 EMERGENCY DEPT VISIT SF MDM: CPT

## 2021-04-14 PROCEDURE — 99282 EMERGENCY DEPT VISIT SF MDM: CPT | Performed by: PHYSICIAN ASSISTANT

## 2021-04-14 NOTE — ED PROVIDER NOTES
History  Chief Complaint   Patient presents with    Laceration     Pt with Past Medical History: Cerebral palsy, Intracerebral hemorrhage, Premature birth, Seizures/last seizure sept 2017  Past Surgical History: ACHILLES TENDON REPAIR, BRAIN SURGERY, EYE SURGERY, TONSILLECTOMY with adenoidectomy, VAGAL NERVE STIMULATOR (VNS) PLACEMENT by incision  Presents to ED c/o right palm laceration that she sustained yesterday after she stepping in a hole fell on ground, with hand behind; thinks she cut it on a piece of gutter/ pipe sticking up  Pt cleaned and dressed wound, but was unable to seek treatment until today  Went to a urgent care and was told they could not close it because of time delay, so came to emergency department  Patient has no other injuries or complaints  Patient did not hit head, has been ambulatory since  tetanus up-to-date          Prior to Admission Medications   Prescriptions Last Dose Informant Patient Reported?  Taking?   budesonide (ENTOCORT EC) 3 MG capsule   No No   Sig: Take 3 capsules (9 mg total) by mouth daily   Patient not taking: Reported on 10/8/2019   ferrous sulfate (KIM-IN-SOL) 75 (15 Fe) mg/mL drops   Yes No   Sig: Take by mouth   ferrous sulfate 325 (65 Fe) mg tablet   Yes No   Sig: Take 1 tablet by mouth Twice daily   lamoTRIgine (LaMICtal) 200 MG tablet   Yes No   Sig: Take 100 mg by mouth 2 (two) times a day   sulfaSALAzine (AZULFIDINE-EN) 500 mg EC tablet   No No   Sig: TAKE 2 TABLETS BY MOUTH TWICE A DAY      Facility-Administered Medications: None       Past Medical History:   Diagnosis Date    [facility]     [facility]re[facility] hemorrhage [facility]     Premature birth     Seizures ([facility] [facility] )     last seizure sept 2017       Past Surgical History:   Procedure Laterality Date    ACHILLES TENDON REPAIR      ruptured, length of tendon, no rupture    BRAIN SURGERY      EYE SURGERY      [place] COLONOSCOPY FLX DX W/COLLJ SPEC WHEN PFRMD N/A 11/16/2017    Procedure: EGD AND COLONOSCOPY;  Surgeon: Tomas Thacker MD;  [facility]; Service: Gastroenterology    [place] COLONOSCOPY FLX DX W/COLLJ SPEC WHEN PFRMD N/A 10/10/2018    Procedure: COLONOSCOPY;  Surgeon: Tomas Thacker MD;  [facility]; Service: Gastroenterology    TONSILLECTOMY      with adenoidectomy    VAGAL NERVE STIMULATOR (VNS) PLACEMENT      by incision       Family History   Problem Relation Age of Onset    Ovarian cancer Mother      I have reviewed and agree with the history as documented  E-Cigarette/Vaping     E-Cigarette/Vaping Substances     Social History     Tobacco Use    Smoking status: Never Smoker    Smokeless tobacco: Never Used   Substance Use Topics    Alcohol use: No    Drug use: No       Review of Systems   Constitutional: Negative for chills and fever  HENT: Negative for hearing loss, sore throat and trouble swallowing  Eyes: Negative for visual disturbance  Respiratory: Negative for cough and shortness of breath  Cardiovascular: Negative for chest pain and leg swelling  Gastrointestinal: Negative for abdominal pain, nausea and vomiting  Genitourinary: Negative for dysuria and frequency  Musculoskeletal: Negative for arthralgias, gait problem, joint swelling and myalgias  Skin: Positive for wound  Negative for color change and pallor  Neurological: Negative for dizziness, weakness, light-headedness and numbness  Psychiatric/Behavioral: Negative for behavioral problems  All other systems reviewed and are negative  Physical Exam  Physical Exam  Vitals signs and nursing note reviewed  Constitutional:       General: She is not in acute distress  Appearance: Normal appearance  She is well-developed  HENT:      Head: Normocephalic and atraumatic  Right Ear: External ear normal       Left Ear: External ear normal       Nose: Nose normal       Mouth/Throat:      Mouth: Mucous membranes are moist       Pharynx: Oropharynx is clear     Eyes: Conjunctiva/sclera: Conjunctivae normal    Neck:      Musculoskeletal: Normal range of motion  Cardiovascular:      Rate and Rhythm: Normal rate  Pulmonary:      Effort: Pulmonary effort is normal  No respiratory distress  Musculoskeletal: Normal range of motion  Lymphadenopathy:      Cervical: No cervical adenopathy  Skin:     General: Skin is warm and dry  Comments: + 2 5cm subcutaneous, healing laceration noted to right palm with edges in good approximation, some maceration/whitening of tissue to skin edges, no active bleeding, no foreign body, full range of motion of all digits, distal neurovascular intact   Neurological:      General: No focal deficit present  Mental Status: She is alert and oriented to person, place, and time  Motor: No weakness  Psychiatric:         Behavior: Behavior normal          Vital Signs  ED Triage Vitals [04/14/21 1207]   Temperature Pulse Respirations Blood Pressure SpO2   98 2 °F (36 8 °C) 65 18 114/83 99 %      Temp src Heart Rate Source Patient Position - Orthostatic VS BP Location FiO2 (%)   -- Monitor Lying Left arm --      Pain Score       No Pain           Vitals:    04/14/21 1207   BP: 114/83   Pulse: 65   Patient Position - Orthostatic VS: Lying         Visual Acuity      ED Medications  Medications - No data to display    Diagnostic Studies  Results Reviewed     None                 No orders to display              Procedures  Procedures         ED Course                             SBIRT 20yo+      Most Recent Value   SBIRT (22 yo +)   In order to provide better care to our patients, we are screening all of our patients for alcohol and drug use  Would it be okay to ask you these screening questions? Yes Filed at: 04/14/2021 1219   Initial Alcohol Screen: US AUDIT-C    1  How often do you have a drink containing alcohol?  0 Filed at: 04/14/2021 1219   2  How many drinks containing alcohol do you have on a typical day you are drinking?    0 Filed at: 04/14/2021 1219   3a  Male UNDER 65: How often do you have five or more drinks on one occasion? 0 Filed at: 04/14/2021 1219   3b  FEMALE Any Age, or MALE 65+: How often do you have 4 or more drinks on one occassion? 0 Filed at: 04/14/2021 1219   Audit-C Score  0 Filed at: 04/14/2021 1219   [place]: How many times in the past year have you    Used an illegal drug or used a prescription medication for non-medical reasons? Never Filed at: 04/14/2021 1219                    MDM  Number of Diagnoses or Management Options  Laceration of right hand without foreign body, initial encounter:   Diagnosis management comments: No indication for sutures due to delay in seeking treatment ; wound cleaned well, Mepitel dressing, Xeroform, bulky gauze dressing placed      Disposition  Final diagnoses:   Laceration of right hand without foreign body, initial encounter     Time reflects when diagnosis was documented in both MDM as applicable and the Disposition within this note     Time User Action Codes Description Comment    4/14/2021 12:22 PM Mena Gardner, [address] Laceration of right hand without foreign body, initial encounter       ED Disposition     ED Disposition Condition Date/Time Comment    Discharge Stable Wed Apr 14, 2021 12:22 PM Thalia Nyhan discharge to home/self care              Follow-up Information     Follow up With Specialties Details Why [address] Beggs, MD   As needed [address]  [phone]            Discharge Medication List as of 4/14/2021 12:23 PM      CONTINUE these medications which have NOT CHANGED    Details   budesonide (ENTOCORT EC) 3 MG capsule Take 3 capsules (9 mg total) by mouth daily, Starting Tue 11/6/2018, Normal      ferrous sulfate (KIM-IN-SOL) 75 (15 Fe) mg/mL drops Take by mouth, Historical Med      ferrous sulfate 325 (65 Fe) mg tablet Take 1 tablet by mouth Twice daily, Starting Thu 9/14/2017, Historical Med lamoTRIgine (LaMICtal) 200 MG tablet Take 100 mg by mouth 2 (two) times a day, Historical Med      sulfaSALAzine (AZULFIDINE-EN) 500 mg EC tablet TAKE 2 TABLETS BY MOUTH TWICE A DAY, Normal           No discharge procedures on file      PDMP Review     None          ED Provider  Electronically Signed by           Gee Kaur PA-C  04/14/21 6044

## 2021-04-14 NOTE — DISCHARGE INSTRUCTIONS
Use Tylenol 650 mg every 4 hours or Motrin 600 mg every 6 hours; you can alternate the 2 medications taking something every 3 hours for pain as needed    Remove dressing in 24 hours, trying to leave the holed, Mepitel dressing on for next 5-7 days until wound healed; then wash gently with soap and water right over dressing, pat drying keep covered with antibiotic ointment and dressing until healed

## 2021-04-14 NOTE — ED TRIAGE NOTES
C/o fell in the yard yesterday  States sustained laceration to the right hand  Was sent by urgent care for sutures  DTAP UTD   Received in 2020

## 2021-12-30 NOTE — LETTER
November 6, 2018     Lester Tucker MD  [address] [facility] [address]    Patient: Catrachita Brink   YOB: 1990   Date of Visit: 11/6/2018       Dear Dr Humaira Reid:    Thank you for referring Zara Sin to me for evaluation  Below are my notes for this consultation  If you have questions, please do not hesitate to call me  I look forward to following your patient along with you  Sincerely,        Merry Burton MD        CC: No Recipients  Merry Burton MD  11/6/2018  5:29 PM  Sign at close encounter  [address] [facility]  Catrachita Brink 34 y o  female MRN: [id number]            Assessment & Plan:    Pleasant 61-year-old female with Crohn's disease, initially presented with colonic and ileal disease, currently on sulfasalazine      1   Ileocolonic Crohn's disease:  Currently on sulfasalazine with continued elevation of fecal calprotectin and ileal ulcerations however clinically and symptomatically patient is doing very well  -discussed with the patient that options include increasing therapeutic medications including immunosuppressive medications, biologic agents  -given that she is relatively asymptomatic and clinically doing much better suggest that we start with another challenge of budesonide   -there is a possibility that she had side effects in the past with budesonide including headaches and joint pains, she was instructed to give us a call with any recurrent side effects  -we will see her back in 2 to 3 months, if she is tolerating budesonide we can do a 3 month count budesonide and then taper thereafter  -at that point in 2 months would recommend checking sed rate, CRP, fecal calprotectin, CBC and BMP a  -if she has any worsening symptoms or increasing luminal symptoms we will consider imaging studies and likely about point would moved to a biologic agent such as Entyvio or Remicade  -we will have her come back in 2 to 3 months to discuss, hopefully she will tolerate budesonide we will continue this for 3 potentially 6 months        _____________________________________________________________        CC: Follow-up of Crohn's disease    HPI:  Marielle Cobb is a 34 y  o female who is here for follow-up of Crohn's disease  As you know this is a 27-year-old female initially presented with diarrhea and dysphagia found to have Crohn disease with ulcers in her colon and ileum  Initially placed on Pentasa and budesonide, she had significant side effects and stopped her medications  Ultimately we started her on sulfasalazine about 6 months ago and she has been gradually improving  Her inflammatory markers have improved however her fecal calprotectin was continuing to increase, her last colonoscopy demonstrates resolution of the colonic ulcerations, there is still ileal ulceration and inflammation  Patient reports that since her colonoscopy she has been doing well, relative stable with 1 to 2 formed bowel movements daily, she has left lower quadrant abdominal pain after bowel movement which seems to resolve on its own  Her appetite is good, her weight has been stable  She denies any arthralgias or ocular symptoms  She reports compliance with her sulfasalazine  Denies any NSAIDs  ROS:  The remainder of the ROS was negative except for the pertinent positives mentioned in HPI        Allergies: Gluten meal; Lac bovis; and Wheat bran    Medications:   Current Outpatient Prescriptions:     ferrous sulfate 325 (65 Fe) mg tablet, Take 1 tablet by mouth Twice daily, Disp: , Rfl:     lamoTRIgine (LaMICtal) 200 MG tablet, Take 100 mg by mouth 2 (two) times a day, Disp: , Rfl:     sulfaSALAzine (AZULFIDINE-EN) 500 mg EC tablet, Take 2 tablets (1,000 mg total) by mouth 2 (two) times a day, Disp: 120 tablet, Rfl: 5    budesonide (ENTOCORT EC) 3 MG capsule, Take 3 capsules (9 mg total) by mouth daily, Disp: 90 capsule, Rfl: 6    ferrous sulfate (KIM-IN-SOL) 75 (15 Fe) mg/mL drops, Take by mouth, Disp: , Rfl:     Past Medical History:   Diagnosis Date    [facility] [address] [facility]     Intracerebral hemorrhage ([facility] [facility]     Premature birth     Seizures ([facility] [address] )     last seizure sept 2017       Past Surgical History:   Procedure Laterality Date    ACHILLES TENDON REPAIR      ruptured, length of tendon, no rupture    BRAIN SURGERY      EYE SURGERY      MA COLONOSCOPY FLX DX W/COLLJ SPEC WHEN PFRMD N/A 11/16/2017    Procedure: EGD AND COLONOSCOPY;  Surgeon: Orlin Alexandra MD;  [facility]; Service: Gastroenterology    MA COLONOSCOPY FLX DX W/COLLJ SPEC WHEN PFRMD N/A 10/10/2018    Procedure: COLONOSCOPY;  Surgeon: Orlin Alexandra MD;  [facility]; Service: Gastroenterology    TONSILLECTOMY      with adenoidectomy    VAGAL NERVE STIMULATOR (VNS) PLACEMENT      by incision       Family History   Problem Relation Age of Onset    Ovarian cancer Mother         reports that she has never smoked  She has never used smokeless tobacco  She reports that she does not drink alcohol or use drugs        Physical Exam:    /70 (BP Location: Left arm, Patient Position: Sitting, Cuff Size: Standard)   Pulse 72   Temp 97 5 °F (36 4 °C) (Tympanic)   Ht 5' 3" (1 6 m)   Wt 70 6 kg (155 lb 9 6 oz)   BMI 27 56 kg/m²      Gen: wn/wd, NAD, healthy-appearing female  HEENT: anicteric, MMM, no cervical LAD  CVS: RRR, no m/r/g  CHEST: CTA b/l  ABD: +BS, soft, NT,ND, no hepatosplenomegaly  EXT: no c/c/e  NEURO: aaox3  SKIN: NO rashes No

## 2022-08-28 ENCOUNTER — APPOINTMENT (OUTPATIENT)
Dept: URGENT CARE | Age: 32
End: 2022-08-28
Payer: OTHER MISCELLANEOUS

## 2022-08-28 PROCEDURE — G0382 LEV 3 HOSP TYPE B ED VISIT: HCPCS

## 2022-08-28 PROCEDURE — 99283 EMERGENCY DEPT VISIT LOW MDM: CPT

## 2022-09-06 ENCOUNTER — APPOINTMENT (OUTPATIENT)
Dept: URGENT CARE | Age: 32
End: 2022-09-06
Payer: OTHER MISCELLANEOUS

## 2022-09-06 PROCEDURE — 99213 OFFICE O/P EST LOW 20 MIN: CPT

## 2022-09-28 ENCOUNTER — TELEPHONE (OUTPATIENT)
Dept: OTHER | Facility: OTHER | Age: 32
End: 2022-09-28

## 2022-09-28 NOTE — TELEPHONE ENCOUNTER
Patient had allergy panels done in September 2017 and she is calling for those results  Please call patient with results

## 2023-10-30 ENCOUNTER — APPOINTMENT (OUTPATIENT)
Dept: URGENT CARE | Age: 33
End: 2023-10-30

## 2023-11-24 ENCOUNTER — TELEPHONE (OUTPATIENT)
Age: 33
End: 2023-11-24

## 2023-11-24 NOTE — TELEPHONE ENCOUNTER
Pt called to schedule a new pt ov w/ Dr. Jennifer Murphy in [facility]. I informed pt Dr. Jennifer Murphy does not see pt's in [facility] but I can schedule her in [place] if she really wants to stick w/ Dr. Jennifer Murphy. I also mentioned she use to see Dr. Zoey Miller and pt refused Dr. Zoey Miller. I also gave her all the other doctors in [facility] and pt stated she is going to go check all of [facility] doctor profile's and call us back.

## 2023-12-21 ENCOUNTER — OFFICE VISIT (OUTPATIENT)
Dept: GASTROENTEROLOGY | Facility: CLINIC | Age: 33
End: 2023-12-21
Payer: COMMERCIAL

## 2023-12-21 VITALS
BODY MASS INDEX: 23.39 KG/M2 | DIASTOLIC BLOOD PRESSURE: 74 MMHG | WEIGHT: 132 LBS | HEART RATE: 78 BPM | HEIGHT: 63 IN | SYSTOLIC BLOOD PRESSURE: 106 MMHG

## 2023-12-21 DIAGNOSIS — K50.819 CROHN'S DISEASE OF SMALL AND LARGE INTESTINES WITH COMPLICATION (HCC): Primary | ICD-10-CM

## 2023-12-21 PROCEDURE — 99214 OFFICE O/P EST MOD 30 MIN: CPT | Performed by: INTERNAL MEDICINE

## 2023-12-21 RX ORDER — ELECTROLYTES/DEXTROSE
SOLUTION, ORAL ORAL DAILY
COMMUNITY

## 2023-12-21 RX ORDER — PREDNISONE 20 MG/1
TABLET ORAL DAILY
COMMUNITY
Start: 2023-11-21 | End: 2023-12-25

## 2023-12-21 RX ORDER — USTEKINUMAB 90 MG/ML
90 INJECTION, SOLUTION SUBCUTANEOUS
COMMUNITY

## 2023-12-21 NOTE — PROGRESS NOTES
[facility] - Outpatient Follow-up Note  Dulce Ayala 33 y.o. female MRN: [id number]  Encounter: [id number]          ASSESSMENT AND PLAN:      1. Crohn's disease of small and large intestines with complication (HCC)  Crohn's disease with evidence of recent flare.  CT enterography with long segment of distal ileal thickening and narrowing but colonoscopy shortly preceding this without evidence of terminal ileal inflammation.  Question stricture.  On Stelara for the past 2 years and now on a tapering dose of prednisone started in November at 40 mg, currently down to 10 mg daily.  Will recheck inflammatory markers as well as ustekinumab drug level and antibody.  She is currently feeling well with suggestion of remission.  Will continue to wean off of prednisone.      - Ustekinumab and Anti-Ustek Ab; Future  - C-reactive protein; Future  - Calprotectin,Fecal; Future  - CBC; Future  - Comprehensive metabolic panel; Future  - Lipase; Future  - Ambulatory Referral to Gastroenterology; Future    ______________________________________________________________________    SUBJECTIVE: Dulce returns in follow-up of Crohn's disease which was first diagnosed in 2017 on colonoscopy with terminal ileal ulceration and subsequent CT enterography showing fairly extensive distal ileal inflammatory changes.  She was treated in the past with sulfasalazine as well as Entyvio and more recently has been on Stelara for the past 2 years or so seemingly with fairly good control but worsening symptoms over the past several months and recent hospitalization last month.  She has been seen by a number of different physicians at [facility], at [facility] as well as [facility].  Currently she is on a prednisone taper for treatment of a likely flare with elevated fecal calprotectin in July and CT enterography at [facility] on November 19 showing a long segment of distal ileal circumferential thickening with skip  areas and narrowing.  Colonoscopy in late October with reported deep cannulation of the terminal ileum was entirely normal.  Biopsies were not taken.      REVIEW OF SYSTEMS:    ROS   No nausea or vomiting, fever or chills, jaundice or rash, abdominal pain, change in bowel function or blood in stool    Historical Information   Past Medical History:   Diagnosis Date    Cerebral palsy (HCC)     Crohn's disease (HCC)     Intracerebral hemorrhage (HCC)     Premature birth     Seizures (HCC)     last seizure sept 2017     Past Surgical History:   Procedure Laterality Date    ACHILLES TENDON REPAIR      ruptured, length of tendon, no rupture    BRAIN SURGERY      COLONOSCOPY      EYE SURGERY      NJ COLONOSCOPY FLX DX W/COLLJ SPEC WHEN PFRMD N/A 11/16/2017    Procedure: EGD AND COLONOSCOPY;  Surgeon: Jesus Gillespie MD;  [facility];  Service: Gastroenterology    NJ COLONOSCOPY FLX DX W/COLLJ SPEC WHEN PFRMD N/A 10/10/2018    Procedure: COLONOSCOPY;  Surgeon: Jesus Gillespie MD;  [facility];  Service: Gastroenterology    TONSILLECTOMY      with adenoidectomy    UPPER GASTROINTESTINAL ENDOSCOPY      VAGAL NERVE STIMULATOR (VNS) PLACEMENT      by incision     Social History   Social History     Substance and Sexual Activity   Alcohol Use No     Social History     Substance and Sexual Activity   Drug Use No     Social History     Tobacco Use   Smoking Status Never   Smokeless Tobacco Never     Family History   Problem Relation Age of Onset    Ovarian cancer Mother        Meds/Allergies       Current Outpatient Medications:     ASHWAGANDHA PO    Biotin 5000 MCG CAPS    Cholecalciferol 1.25 MG (70136 UT) capsule    Multiple Vitamin (Multivitamin Adult) TABS    predniSONE 20 mg tablet    Stelara 90 MG/ML subcutaneous injection    budesonide (ENTOCORT EC) 3 MG capsule    ferrous sulfate (KIM-IN-SOL) 75 (15 Fe) mg/mL drops    ferrous sulfate 325 (65 Fe) mg tablet    lamoTRIgine (LaMICtal) 200 MG tablet     "sulfaSALAzine (AZULFIDINE-EN) 500 mg EC tablet    Allergies   Allergen Reactions    Gluten Meal - Food Allergy     Milk (Cow)     Wheat Bran - Food Allergy            Objective     Blood pressure 106/74, pulse 78, height 5' 3\" (1.6 m), weight 59.9 kg (132 lb). Body mass index is 23.38 kg/m².      PHYSICAL EXAM:      Physical Exam  Vitals and nursing note reviewed.   Constitutional:       General: She is not in acute distress.     Appearance: She is not ill-appearing.   HENT:      Head: Normocephalic and atraumatic.   Eyes:      General: No scleral icterus.     Extraocular Movements: Extraocular movements intact.   Cardiovascular:      Rate and Rhythm: Normal rate and regular rhythm.   Pulmonary:      Effort: Pulmonary effort is normal. No respiratory distress.   Abdominal:      General: There is no distension.      Palpations: Abdomen is soft.      Tenderness: There is no abdominal tenderness. There is no guarding or rebound.   Musculoskeletal:      Right lower leg: No edema.      Left lower leg: No edema.   Skin:     General: Skin is warm and dry.      Coloration: Skin is not cyanotic.      Findings: No erythema.   Neurological:      General: No focal deficit present.      Mental Status: She is alert and oriented to person, place, and time.   Psychiatric:         Mood and Affect: Mood normal.         Behavior: Behavior normal.          Lab Results:   No visits with results within 1 Day(s) from this visit.   Latest known visit with results is:   Admission on 10/10/2018, Discharged on 10/10/2018   Component Date Value    EXT Preg Test, Ur 10/10/2018 Negative     Case Report 10/10/2018                      Value:Surgical Pathology Report                         Case: [id number]                                   Authorizing Provider:  Jesus Gillespie MD            Collected:           10/10/2018 1129              Ordering Location:     [facility]        Received:            10/10/2018 1549                          "            [facility]                                                      Pathologist:           Macy Caballero MD                                                          Specimen:    Terminal Ileum, Cold Bxd Terminal ILeum                                                    Final Diagnosis 10/10/2018                      Value:This result contains rich text formatting which cannot be displayed here.    Additional Information 10/10/2018                      Value:This result contains rich text formatting which cannot be displayed here.    Gross Description 10/10/2018                      Value:This result contains rich text formatting which cannot be displayed here.    Clinical Information 10/10/2018                      Value:History Crohn's         Radiology Results:   XR wrist 3+ vw left    Result Date: 12/3/2023  Narrative: History: Left wrist pain, swelling, stiffness Comparison: None Technique: 3 views of the left wrist were obtained. Findings: There is normal mineralization and alignment. No fracture, dislocation or osseous lesion is identified. Joint spaces are normal. Normal soft tissues.    Impression: IMPRESSION: No fracture, dislocation or osseous lesion in the left wrist. Workstation:[id number]

## 2023-12-28 ENCOUNTER — TELEPHONE (OUTPATIENT)
Dept: GASTROENTEROLOGY | Facility: CLINIC | Age: 33
End: 2023-12-28

## 2023-12-28 ENCOUNTER — TELEPHONE (OUTPATIENT)
Age: 33
End: 2023-12-28

## 2023-12-28 ENCOUNTER — APPOINTMENT (OUTPATIENT)
Dept: LAB | Facility: HOSPITAL | Age: 33
End: 2023-12-28
Attending: INTERNAL MEDICINE
Payer: COMMERCIAL

## 2023-12-28 DIAGNOSIS — K50.819 CROHN'S DISEASE OF SMALL AND LARGE INTESTINES WITH COMPLICATION (HCC): ICD-10-CM

## 2023-12-28 LAB
ALBUMIN SERPL BCP-MCNC: 3.6 G/DL (ref 3.5–5)
ALP SERPL-CCNC: 52 U/L (ref 34–104)
ALT SERPL W P-5'-P-CCNC: 12 U/L (ref 7–52)
ANION GAP SERPL CALCULATED.3IONS-SCNC: 5 MMOL/L
AST SERPL W P-5'-P-CCNC: 12 U/L (ref 13–39)
BILIRUB SERPL-MCNC: 0.26 MG/DL (ref 0.2–1)
BUN SERPL-MCNC: 18 MG/DL (ref 5–25)
CALCIUM SERPL-MCNC: 8.9 MG/DL (ref 8.4–10.2)
CHLORIDE SERPL-SCNC: 104 MMOL/L (ref 96–108)
CO2 SERPL-SCNC: 29 MMOL/L (ref 21–32)
CREAT SERPL-MCNC: 0.87 MG/DL (ref 0.6–1.3)
CRP SERPL QL: 1.1 MG/L
ERYTHROCYTE [DISTWIDTH] IN BLOOD BY AUTOMATED COUNT: 13.7 % (ref 11.6–15.1)
GFR SERPL CREATININE-BSD FRML MDRD: 89 ML/MIN/1.73SQ M
GLUCOSE P FAST SERPL-MCNC: 88 MG/DL (ref 65–99)
HCT VFR BLD AUTO: 35.4 % (ref 34.8–46.1)
HGB BLD-MCNC: 11.1 G/DL (ref 11.5–15.4)
LIPASE SERPL-CCNC: 21 U/L (ref 11–82)
MCH RBC QN AUTO: 31.4 PG (ref 26.8–34.3)
MCHC RBC AUTO-ENTMCNC: 31.4 G/DL (ref 31.4–37.4)
MCV RBC AUTO: 100 FL (ref 82–98)
PLATELET # BLD AUTO: 254 THOUSANDS/UL (ref 149–390)
PMV BLD AUTO: 8.9 FL (ref 8.9–12.7)
POTASSIUM SERPL-SCNC: 4.1 MMOL/L (ref 3.5–5.3)
PROT SERPL-MCNC: 5.8 G/DL (ref 6.4–8.4)
RBC # BLD AUTO: 3.53 MILLION/UL (ref 3.81–5.12)
SODIUM SERPL-SCNC: 138 MMOL/L (ref 135–147)
WBC # BLD AUTO: 8.27 THOUSAND/UL (ref 4.31–10.16)

## 2023-12-28 PROCEDURE — 85027 COMPLETE CBC AUTOMATED: CPT

## 2023-12-28 PROCEDURE — 86140 C-REACTIVE PROTEIN: CPT

## 2023-12-28 PROCEDURE — 83993 ASSAY FOR CALPROTECTIN FECAL: CPT

## 2023-12-28 PROCEDURE — 82397 CHEMILUMINESCENT ASSAY: CPT

## 2023-12-28 PROCEDURE — 83690 ASSAY OF LIPASE: CPT

## 2023-12-28 PROCEDURE — 36415 COLL VENOUS BLD VENIPUNCTURE: CPT

## 2023-12-28 PROCEDURE — 80299 QUANTITATIVE ASSAY DRUG: CPT

## 2023-12-28 PROCEDURE — 80053 COMPREHEN METABOLIC PANEL: CPT

## 2023-12-28 NOTE — TELEPHONE ENCOUNTER
Karla from call center called office regarding pt being scheduled with Dr Chaney 2/27/24 @ 1pm for [facility]. We are unable to see his schedule... please assist

## 2023-12-28 NOTE — TELEPHONE ENCOUNTER
Patients GI provider:  Dr. MEJIA    Number to return call: ([phone] (     Reason for call: Pt calling to arrange appt with Dr. Chaney. Patient scheduled for 2/27/2024 @ 1:00 with Dr. Chaney in [place] for [facility]. Dr. Mejia referral.     Call center unable to place on schedule due to no overbooking priviliges.

## 2023-12-28 NOTE — TELEPHONE ENCOUNTER
Called patient, reached voicemail, left message to call back to reschedule appointment on 2/27, Dr. Chaney will not be in the [place] office 2/27.

## 2023-12-29 NOTE — TELEPHONE ENCOUNTER
I spoke with the patient. Patient scheduled 3/6/2024 with Dr. Chaney at [facility], pt aware of address via [address]. Pt will call back if she would like a sooner appointment.

## 2024-01-02 LAB — CALPROTECTIN STL-MCNT: 103 UG/G (ref 0–120)

## 2024-01-03 LAB
USTEKINUMAB AB SERPL IA-MCNC: <40 NG/ML
USTEKINUMAB SERPL IA-MCNC: 11 UG/ML

## 2024-01-05 NOTE — RESULT ENCOUNTER NOTE
Adán Cardona, your recent lab tests look good.  Your CRP and fecal calprotectin are markedly improved and suggest the previous inflammation is well-controlled.  Your Stelara drug level was in the goal range and no antibody was detectable.  Your hemoglobin level is slightly low but relatively stable and is likely due to small amount of blood loss from prior inflammation.  This should return to normal with time

## 2024-03-06 ENCOUNTER — OFFICE VISIT (OUTPATIENT)
Dept: GASTROENTEROLOGY | Facility: CLINIC | Age: 34
End: 2024-03-06
Payer: COMMERCIAL

## 2024-03-06 ENCOUNTER — TELEPHONE (OUTPATIENT)
Dept: GASTROENTEROLOGY | Facility: CLINIC | Age: 34
End: 2024-03-06

## 2024-03-06 VITALS
WEIGHT: 151.2 LBS | DIASTOLIC BLOOD PRESSURE: 68 MMHG | HEART RATE: 58 BPM | BODY MASS INDEX: 26.79 KG/M2 | TEMPERATURE: 97.5 F | OXYGEN SATURATION: 98 % | SYSTOLIC BLOOD PRESSURE: 103 MMHG | HEIGHT: 63 IN

## 2024-03-06 DIAGNOSIS — R19.5 ABNORMAL STOOLS: ICD-10-CM

## 2024-03-06 DIAGNOSIS — M25.541 ARTHRALGIA OF BOTH HANDS: ICD-10-CM

## 2024-03-06 DIAGNOSIS — M25.542 ARTHRALGIA OF BOTH HANDS: ICD-10-CM

## 2024-03-06 DIAGNOSIS — K50.819 CROHN'S DISEASE OF SMALL AND LARGE INTESTINES WITH COMPLICATION (HCC): Primary | ICD-10-CM

## 2024-03-06 DIAGNOSIS — D84.9 IMMUNOSUPPRESSED STATUS (HCC): ICD-10-CM

## 2024-03-06 PROCEDURE — 99215 OFFICE O/P EST HI 40 MIN: CPT | Performed by: INTERNAL MEDICINE

## 2024-03-06 RX ORDER — FOLIC ACID 1 MG/1
1 TABLET ORAL DAILY
Qty: 30 TABLET | Refills: 5 | Status: SHIPPED | OUTPATIENT
Start: 2024-03-06

## 2024-03-06 RX ORDER — SULFASALAZINE 500 MG/1
1000 TABLET, DELAYED RELEASE ORAL 2 TIMES DAILY
Qty: 120 TABLET | Refills: 3 | Status: SHIPPED | OUTPATIENT
Start: 2024-03-06

## 2024-03-06 NOTE — TELEPHONE ENCOUNTER
Procedure: colon  Date: May 7th  Physician performing: Dr. Chaney  Location of procedure:  [place]  Instructions given to patient: Miralax  Diabetic: n/a  Clearances: n/a

## 2024-03-06 NOTE — PATIENT INSTRUCTIONS
Continue Stelara every 8 weeks  Next Stelara level right before the next injection  Next blood work and stool tests ordered today  Colonoscopy  Enterography    Referral to rheumatologist  Trial of sulfasalazine and folic acid - I sent this to your pharmacy    General IBD Healthcare maintenance recommendations:  Avoid live virus vaccines  Yearly flu shot  COVID vaccine and booster  Pneumonia vaccine  Shingrix  Routine skin exams with the dermatologist  Routine Pap smears

## 2024-03-06 NOTE — PROGRESS NOTES
[facility] - Outpatient Consultation  Dulce Ayala 33 y.o. female MRN: [id number]  Encounter: [id number]          ASSESSMENT AND PLAN:    Dulce Ayala is a 33 y.o. female with Crohn's disease diagnosed in 2017 with ileal inflammation, previously treated with sulfasalazine, Entyvio, currently on Stelara for over 2 years but with recent increase symptoms over the past several months who now presents for follow-up and to establish care. Her bowel symptoms have been better but she has been having joint pains.     CT abdomen pelvis with contrast November 2023 notable for decreased prominence of previously seen moderately dilated small bowel loops, long segment of the distal ileum extending to the terminal ileum with mild circumferential wall thickening with skip areas of narrowing/stricture consistent with greater than history of Crohn's disease with mild associated mesenteric edema and trace ascites in the right lower quadrant, overall decreased since the recent examination, as well as trace bilateral pleural effusions with some atelectasis.  CT abdomen pelvis from 3 days prior with high-grade distal small bowel obstruction due to wall thickening and mucosal hyperenhancement in the distal ileum.    Fluoroscopic upper GI with small bowel follow-through from April 2022 with unremarkable upper GI series and extensive ileitis redemonstrated with luminal narrowing, outpouching, bowel loop separation and overall similar to exam from June 2021.    Last colonoscopy from [facility] performed September 2021.  Biopsies reportedly with no active ileitis and colonic mucosa with no active colitis.  Prior to that there was an colonoscopy and EGD at [facility] from 2020.  Biopsies reportedly with no significant abnormalities in the terminal ileum or colon at that time.    DEXA scan from 2017 was normal.    Most recent blood work in our system notable for CMP from December with normal  electrolytes, creatinine, and overall normal liver tests-total protein slightly low at 5.8.  CBC at that time with macrocytic anemia with hemoglobin low at 11.1 and MCV elevated at 100.  White blood cell count and platelet count normal.  CRP at that time normal.  Calprotectin in the upper borderline range at 103.  Ustekinumab level reportedly 11 with no antidrug antibodies at that time.  Calprotectin from July 2023 elevated at 493.  Vitamin D and vitamin B12 level from July 2023 were both normal.  Iron panel from July 2023 with relatively normal percent saturation and iron.    1. Crohn's disease of small and large intestines with complication (HCC)    2. Immunosuppressed status (HCC)    3. Arthralgia of both hands    4. Abnormal stools        Orders Placed This Encounter   Procedures    Calprotectin,Fecal    MRI enterography w wo    CBC and differential    Comprehensive metabolic panel    C-reactive protein    Vitamin B12    Vitamin D 25 hydroxy    Chronic Hepatitis Panel    Ustekinumab and Anti-Ustek Ab    Ambulatory Referral to Rheumatology    Colonoscopy     Continue Stelara every 8 weeks  Next Stelara level right before the next injection  Next blood work ordered today.  Also ordered calprotectin  Next quant gold and hepatitis panel ordered today  Wean/avoid all prednisone if possible    Possible future medication options include use of Remicade/Humira Skyrizi versus Rinvoq    Recommend diet high in soluble fiber but with significant texture modification to allow for passage of food through the areas of possible stricturing and narrowing.  Recommend that vegetables be well cooked and peeled if possible.  Recommend soups and purées and soft textures.  Recommend high-protein diet with soy, soft well cooked fish, not butters, soft or brown poultry.    Next colonoscopy due now  Next enterography due now    Referral to rheumatologist  Trial of sulfasalazine and folic acid    General IBD Healthcare maintenance  recommendations:  Avoid live virus vaccines  Yearly flu shot  COVID vaccine and booster  Pneumonia vaccine  Shingrix  Routine skin exams with the dermatologist  Routine Pap smears    I have spent a total time of 40 minutes on 03/06/24 in caring for this patient including Diagnostic results, Prognosis, Risks and benefits of tx options, Instructions for management, Patient and family education, Importance of tx compliance, Risk factor reductions, Impressions, Counseling / Coordination of care, Documenting in the medical record, Reviewing / ordering tests, medicine, procedures  , and Obtaining or reviewing history  .    ______________________________________________________________________    Referred by Dr. Huffman for Crohn's    HPI:    Dulce Ayala is a 33 y.o. female who presents with complaint of Crohn's disease.     She was diagnosed in 2017. Started on oral medications. When she turned 28 she switched networks and she was switched to biologics. She did entyvio but it did not work. She then switched to Stelara. She did not feel that she got great care.     She is having issues and not sure if the medication. She is having joint pains, going from the fingers on both hands. It started in early December. Initially she thought it was an injury. She had back pains in the past from a flare. Recently also on the shin. No NSAIDs. She has not tried Tylenol. Usually not bad in the morning. No rashes. No recent mouth sores. No issues with eye pain or vision. No recent headaches. She used to get 1 sided headaches. 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 It is now resolved.   Weight fluctuates. Appetite is good.   1-2 BMs per day, usually formed  No bright red blood per rectum/rectal bleeding, no melena  No significant urgency, no nocturnal BMs, no incontinence.  Some lower abdominal pain after a BM. She does not think she is straining much.   No heartburn, dysphagia, odynophagia, nausea, vomiting     REVIEW OF  SYSTEMS:  10 point ROS reviewed and negative, except as above        Historical Information   Past Medical History:   Diagnosis Date    Cerebral palsy (HCC)     Crohn's disease (HCC)     Intracerebral hemorrhage (HCC)     Premature birth     Seizures (HCC)     last seizure sept 2017     Past Surgical History:   Procedure Laterality Date    ACHILLES TENDON REPAIR      ruptured, length of tendon, no rupture    BRAIN SURGERY      COLONOSCOPY      EYE SURGERY      [place] COLONOSCOPY FLX DX W/COLLJ SPEC WHEN PFRMD N/A 11/16/2017    Procedure: EGD AND COLONOSCOPY;  Surgeon: Jesus Gillespie MD;  [facility];  Service: Gastroenterology    [place] COLONOSCOPY FLX DX W/COLLJ SPEC WHEN PFRMD N/A 10/10/2018    Procedure: COLONOSCOPY;  Surgeon: Jesus Gillespie MD;  [facility];  Service: Gastroenterology    TONSILLECTOMY      with adenoidectomy    UPPER GASTROINTESTINAL ENDOSCOPY      VAGAL NERVE STIMULATOR (VNS) PLACEMENT      by incision     Social History   Social History     Substance and Sexual Activity   Alcohol Use No     Social History     Substance and Sexual Activity   Drug Use No     Social History     Tobacco Use   Smoking Status Never   Smokeless Tobacco Never     Family History   Problem Relation Age of Onset    Ovarian cancer Mother        Meds/Allergies       Current Outpatient Medications:     ASHWAGANDHA PO    Biotin 5000 MCG CAPS    Cholecalciferol 1.25 MG (26924 UT) capsule    folic acid ( Folic Acid) 1 mg tablet    Multiple Vitamin (Multivitamin Adult) TABS    Stelara 90 MG/ML subcutaneous injection    sulfaSALAzine (AZULFIDINE-EN) 500 mg EC tablet    budesonide (ENTOCORT EC) 3 MG capsule    ferrous sulfate (KIM-IN-SOL) 75 (15 Fe) mg/mL drops    ferrous sulfate 325 (65 Fe) mg tablet    lamoTRIgine (LaMICtal) 200 MG tablet    Allergies   Allergen Reactions    Gluten Meal - Food Allergy     Milk (Cow)     Wheat Bran - Food Allergy            Objective     Blood pressure 103/68, pulse 58, temperature  "97.5 °F (36.4 °C), temperature source Tympanic, height 5' 3\" (1.6 m), weight 68.6 kg (151 lb 3.2 oz), SpO2 98%. Body mass index is 26.78 kg/m².      PHYSICAL EXAMINATION:    General Appearance:   Alert, cooperative, no distress   HEENT:  Normocephalic, atraumatic, anicteric. Neck supple, symmetrical, trachea midline.   Lungs:   Equal chest rise and unlabored breathing, normal effort, no coughing.   Cardiovascular:   No visualized JVD.   Abdomen:   No abdominal distension.   Skin:   No jaundice, rashes, or lesions.    Musculoskeletal:   Normal range of motion visualized.   Psych:  Normal affect and normal insight.   Neuro:  Alert and appropriate.         Lab Results:   No visits with results within 1 Day(s) from this visit.   Latest known visit with results is:   Lab on 12/28/2023   Component Date Value    Ustekinumab 12/28/2023 11     Anti-Ustekinumab Antibody 12/28/2023 <40     CRP 12/28/2023 1.1     WBC 12/28/2023 8.27     RBC 12/28/2023 3.53 (L)     Hemoglobin 12/28/2023 11.1 (L)     Hematocrit 12/28/2023 35.4     MCV 12/28/2023 100 (H)     MCH 12/28/2023 31.4     MCHC 12/28/2023 31.4     RDW 12/28/2023 13.7     Platelets 12/28/2023 254     MPV 12/28/2023 8.9     Sodium 12/28/2023 138     Potassium 12/28/2023 4.1     Chloride 12/28/2023 104     CO2 12/28/2023 29     ANION GAP 12/28/2023 5     BUN 12/28/2023 18     Creatinine 12/28/2023 0.87     Glucose, Fasting 12/28/2023 88     Calcium 12/28/2023 8.9     AST 12/28/2023 12 (L)     ALT 12/28/2023 12     Alkaline Phosphatase 12/28/2023 52     Total Protein 12/28/2023 5.8 (L)     Albumin 12/28/2023 3.6     Total Bilirubin 12/28/2023 0.26     eGFR 12/28/2023 89     Lipase 12/28/2023 21     Calprotectin 12/28/2023 103        Lab Results   Component Value Date    WBC 8.27 12/28/2023    HGB 11.1 (L) 12/28/2023    HCT 35.4 12/28/2023     (H) 12/28/2023     12/28/2023       Lab Results   Component Value Date     03/02/2015    SODIUM 138 12/28/2023    K " 4.1 12/28/2023     12/28/2023    CO2 29 12/28/2023    ANIONGAP 7 03/02/2015    AGAP 5 12/28/2023    BUN 18 12/28/2023    CREATININE 0.87 12/28/2023    GLUC 107 (H) 11/20/2023    GLUF 88 12/28/2023    CALCIUM 8.9 12/28/2023    AST 12 (L) 12/28/2023    ALT 12 12/28/2023    ALKPHOS 52 12/28/2023    PROT 7.3 03/02/2015    TP 5.8 (L) 12/28/2023    BILITOT 0.2 03/02/2015    TBILI 0.26 12/28/2023    EGFR 89 12/28/2023       Lab Results   Component Value Date    CRP 1.1 12/28/2023       Lab Results   Component Value Date    TIQ8DBWSHGVW 1.410 09/12/2017    TSH 0.78 10/13/2020       Lab Results   Component Value Date    IRON 13 (L) 09/12/2017       Radiology Results:   No results found.

## 2024-03-20 DIAGNOSIS — K50.819 CROHN'S DISEASE OF SMALL AND LARGE INTESTINES WITH COMPLICATION (HCC): Primary | ICD-10-CM

## 2024-04-09 ENCOUNTER — APPOINTMENT (OUTPATIENT)
Dept: LAB | Facility: MEDICAL CENTER | Age: 34
End: 2024-04-09
Payer: COMMERCIAL

## 2024-04-09 ENCOUNTER — HOSPITAL ENCOUNTER (OUTPATIENT)
Dept: RADIOLOGY | Facility: MEDICAL CENTER | Age: 34
Discharge: HOME | End: 2024-04-09
Payer: COMMERCIAL

## 2024-04-09 ENCOUNTER — TELEPHONE (OUTPATIENT)
Age: 34
End: 2024-04-09

## 2024-04-09 DIAGNOSIS — D84.9 IMMUNOSUPPRESSED STATUS (HCC): ICD-10-CM

## 2024-04-09 DIAGNOSIS — K50.819 CROHN'S DISEASE OF SMALL AND LARGE INTESTINES WITH COMPLICATION (HCC): ICD-10-CM

## 2024-04-09 DIAGNOSIS — M25.541 ARTHRALGIA OF BOTH HANDS: ICD-10-CM

## 2024-04-09 DIAGNOSIS — R19.5 ABNORMAL STOOLS: ICD-10-CM

## 2024-04-09 DIAGNOSIS — M25.542 ARTHRALGIA OF BOTH HANDS: ICD-10-CM

## 2024-04-09 LAB
25(OH)D3 SERPL-MCNC: 21.9 NG/ML (ref 30–100)
ALBUMIN SERPL BCP-MCNC: 4.3 G/DL (ref 3.5–5)
ALP SERPL-CCNC: 48 U/L (ref 34–104)
ALT SERPL W P-5'-P-CCNC: 11 U/L (ref 7–52)
ANION GAP SERPL CALCULATED.3IONS-SCNC: 6 MMOL/L (ref 4–13)
AST SERPL W P-5'-P-CCNC: 16 U/L (ref 13–39)
BILIRUB SERPL-MCNC: 0.42 MG/DL (ref 0.2–1)
BUN SERPL-MCNC: 13 MG/DL (ref 5–25)
CALCIUM SERPL-MCNC: 9.2 MG/DL (ref 8.4–10.2)
CHLORIDE SERPL-SCNC: 101 MMOL/L (ref 96–108)
CO2 SERPL-SCNC: 28 MMOL/L (ref 21–32)
CREAT SERPL-MCNC: 0.81 MG/DL (ref 0.6–1.3)
CRP SERPL QL: 1.2 MG/L
GFR SERPL CREATININE-BSD FRML MDRD: 97 ML/MIN/1.73SQ M
GLUCOSE P FAST SERPL-MCNC: 83 MG/DL (ref 65–99)
HBV CORE AB SER QL: NORMAL
HBV CORE IGM SER QL: NORMAL
HBV SURFACE AG SER QL: NORMAL
HCV AB SER QL: NORMAL
POTASSIUM SERPL-SCNC: 3.8 MMOL/L (ref 3.5–5.3)
PROT SERPL-MCNC: 6.6 G/DL (ref 6.4–8.4)
SODIUM SERPL-SCNC: 135 MMOL/L (ref 135–147)
VIT B12 SERPL-MCNC: 537 PG/ML (ref 180–914)

## 2024-04-09 PROCEDURE — 36415 COLL VENOUS BLD VENIPUNCTURE: CPT

## 2024-04-09 PROCEDURE — 86704 HEP B CORE ANTIBODY TOTAL: CPT

## 2024-04-09 PROCEDURE — 86705 HEP B CORE ANTIBODY IGM: CPT

## 2024-04-09 PROCEDURE — 82397 CHEMILUMINESCENT ASSAY: CPT

## 2024-04-09 PROCEDURE — 82306 VITAMIN D 25 HYDROXY: CPT

## 2024-04-09 PROCEDURE — 86480 TB TEST CELL IMMUN MEASURE: CPT

## 2024-04-09 PROCEDURE — 87340 HEPATITIS B SURFACE AG IA: CPT

## 2024-04-09 PROCEDURE — 82607 VITAMIN B-12: CPT

## 2024-04-09 PROCEDURE — 80053 COMPREHEN METABOLIC PANEL: CPT

## 2024-04-09 PROCEDURE — 80299 QUANTITATIVE ASSAY DRUG: CPT

## 2024-04-09 PROCEDURE — 86140 C-REACTIVE PROTEIN: CPT

## 2024-04-09 PROCEDURE — 86803 HEPATITIS C AB TEST: CPT

## 2024-04-09 NOTE — TELEPHONE ENCOUNTER
Patients GI provider:  Dr. Loomis    Number to return call: ( [phone]    Reason for call: [facility] calling to make provider aware CT small bowel enterography was not completed due to hard stick she will need to simon this at a HOSP setting please contact patient to make aware of this     Scheduled procedure/appointment date if applicable: Apt/procedure

## 2024-04-10 ENCOUNTER — APPOINTMENT (OUTPATIENT)
Dept: LAB | Facility: HOSPITAL | Age: 34
End: 2024-04-10
Payer: COMMERCIAL

## 2024-04-10 DIAGNOSIS — M25.541 ARTHRALGIA OF BOTH HANDS: ICD-10-CM

## 2024-04-10 DIAGNOSIS — Z00.6 ENCOUNTER FOR EXAMINATION FOR NORMAL COMPARISON OR CONTROL IN CLINICAL RESEARCH PROGRAM: ICD-10-CM

## 2024-04-10 DIAGNOSIS — K50.819 CROHN'S DISEASE OF SMALL AND LARGE INTESTINES WITH COMPLICATION (HCC): ICD-10-CM

## 2024-04-10 DIAGNOSIS — M25.542 ARTHRALGIA OF BOTH HANDS: ICD-10-CM

## 2024-04-10 DIAGNOSIS — D84.9 IMMUNOSUPPRESSED STATUS (HCC): ICD-10-CM

## 2024-04-10 DIAGNOSIS — R19.5 ABNORMAL STOOLS: ICD-10-CM

## 2024-04-10 LAB
GAMMA INTERFERON BACKGROUND BLD IA-ACNC: 0.01 IU/ML
M TB IFN-G BLD-IMP: NEGATIVE
M TB IFN-G CD4+ BCKGRND COR BLD-ACNC: 0 IU/ML
M TB IFN-G CD4+ BCKGRND COR BLD-ACNC: 0.01 IU/ML
MITOGEN IGNF BCKGRD COR BLD-ACNC: 2.3 IU/ML

## 2024-04-10 PROCEDURE — 83993 ASSAY FOR CALPROTECTIN FECAL: CPT

## 2024-04-10 NOTE — TELEPHONE ENCOUNTER
Called the patient and connected with vm. Relayed message from imaging center recommending to r/s in hospital setting. Phone number for central scheduling provided to pt in message.

## 2024-04-15 ENCOUNTER — TELEPHONE (OUTPATIENT)
Dept: GASTROENTEROLOGY | Facility: CLINIC | Age: 34
End: 2024-04-15

## 2024-04-15 DIAGNOSIS — K50.819 CROHN'S DISEASE OF SMALL AND LARGE INTESTINES WITH COMPLICATION (HCC): Primary | ICD-10-CM

## 2024-04-15 DIAGNOSIS — D84.9 IMMUNOSUPPRESSED STATUS (HCC): ICD-10-CM

## 2024-04-15 LAB
CALPROTECTIN STL-MCNT: 53 UG/G (ref 0–120)
USTEKINUMAB AB SERPL IA-MCNC: <40 NG/ML
USTEKINUMAB SERPL IA-MCNC: 3.3 UG/ML

## 2024-04-17 RX ORDER — USTEKINUMAB 90 MG/ML
INJECTION, SOLUTION SUBCUTANEOUS
Qty: 1 ML | Refills: 8 | Status: SHIPPED | OUTPATIENT
Start: 2024-04-17

## 2024-04-17 NOTE — TELEPHONE ENCOUNTER
Received approval    Medication: Stelara 90mg Syringe  Directions: Inject 90mg every 6 weeks  Quantity: 1ML  Day Supply: 42  Insurance: [employer]  How Prior Auth was submitted: Nicky  Authorization Date range: 4/16/2024 - 4/16/2025  Authorization Number:  Pharmacy that fills med: [facility]    Letter in Media

## 2024-04-23 ENCOUNTER — ANESTHESIA (OUTPATIENT)
Dept: ANESTHESIOLOGY | Facility: HOSPITAL | Age: 34
End: 2024-04-23

## 2024-04-23 ENCOUNTER — ANESTHESIA EVENT (OUTPATIENT)
Dept: ANESTHESIOLOGY | Facility: HOSPITAL | Age: 34
End: 2024-04-23

## 2024-04-25 ENCOUNTER — APPOINTMENT (OUTPATIENT)
Dept: LAB | Facility: CLINIC | Age: 34
End: 2024-04-25
Payer: COMMERCIAL

## 2024-04-25 ENCOUNTER — HOSPITAL ENCOUNTER (OUTPATIENT)
Dept: CT IMAGING | Facility: HOSPITAL | Age: 34
Discharge: HOME | End: 2024-04-25
Attending: INTERNAL MEDICINE
Payer: COMMERCIAL

## 2024-04-25 DIAGNOSIS — Z00.6 ENCOUNTER FOR EXAMINATION FOR NORMAL COMPARISON OR CONTROL IN CLINICAL RESEARCH PROGRAM: ICD-10-CM

## 2024-04-25 PROCEDURE — 36415 COLL VENOUS BLD VENIPUNCTURE: CPT

## 2024-04-25 PROCEDURE — 74177 CT ABD & PELVIS W/CONTRAST: CPT

## 2024-04-25 RX ADMIN — IOHEXOL 85 ML: 350 INJECTION, SOLUTION INTRAVENOUS at 15:31

## 2024-05-03 ENCOUNTER — TELEPHONE (OUTPATIENT)
Dept: GASTROENTEROLOGY | Facility: MEDICAL CENTER | Age: 34
End: 2024-05-03

## 2024-05-03 NOTE — TELEPHONE ENCOUNTER
Called patient to reschedule procedure to hospital setting per staff at [facility], due to being a hard stick.

## 2024-05-06 ENCOUNTER — TELEPHONE (OUTPATIENT)
Age: 34
End: 2024-05-06

## 2024-05-06 NOTE — TELEPHONE ENCOUNTER
Spoke with patient - tried to explain about the rescheduling of her procedure    Called Dilma at [facility] to find out more about why she was rescheduled - emergency scenario she would be better suited at a hospital due to ceberal palsy    She was upset about how the information was presented to her and didn't understand why no one told her about it while speaking with her - I explained I believed this case was escalated over the weekend between two anesthesiologist     She is scheduled July 1st and we will watch for cancellations on may 23rd may 30th at [facility] and June 6th at [facility]

## 2024-05-06 NOTE — TELEPHONE ENCOUNTER
Patients GI provider:  Dr. Chaney    Number to return call: [phone]    Reason for call: Pt calling stating she received 2 calls on Friday re: her colonoscopy was canceled due to red flag. Pt seemed irritated since she was communicating w/ Dr. Chaney and stated he never mentioned this and he might not know. I told pt the Anesthesiologist was the one who wants her scheduled in the hospital and that she was rs for 7/1/24 at the [facility]. I also told pt that the doctor has a really busy schedule and I'm sure he will be informed about it. Pt was beside herself and kept saying that she wants to call the office and speak w/ the doctor. I tried to explain that I work w/ the office and I can send them a message. I was trying to figure out how exactly to help her when she got irritated and hung up on me.    Scheduled procedure/appointment date if applicable: Procedure 7/1/24

## 2024-05-06 NOTE — TELEPHONE ENCOUNTER
Pt called because she is irritated because her procedure was rescheduled. Transferred call to Pamela

## 2024-05-12 LAB
APOB+LDLR+PCSK9 GENE MUT ANL BLD/T: NOT DETECTED
BRCA1+BRCA2 DEL+DUP + FULL MUT ANL BLD/T: NOT DETECTED
MLH1+MSH2+MSH6+PMS2 GN DEL+DUP+FUL M: NOT DETECTED

## 2024-06-19 ENCOUNTER — TELEPHONE (OUTPATIENT)
Dept: GASTROENTEROLOGY | Facility: CLINIC | Age: 34
End: 2024-06-19

## 2024-06-19 NOTE — TELEPHONE ENCOUNTER
Confirming Upcoming Procedure: colon on July 1  Physician performing: Dr. Chaney  Location of procedure:    Prep: miralax

## 2024-06-30 RX ORDER — LIDOCAINE HYDROCHLORIDE 10 MG/ML
0.5 INJECTION, SOLUTION EPIDURAL; INFILTRATION; INTRACAUDAL; PERINEURAL ONCE AS NEEDED
Status: CANCELLED | OUTPATIENT
Start: 2024-06-30

## 2024-06-30 RX ORDER — SODIUM CHLORIDE, SODIUM LACTATE, POTASSIUM CHLORIDE, CALCIUM CHLORIDE 600; 310; 30; 20 MG/100ML; MG/100ML; MG/100ML; MG/100ML
125 INJECTION, SOLUTION INTRAVENOUS CONTINUOUS
Status: CANCELLED | OUTPATIENT
Start: 2024-06-30

## 2024-07-01 ENCOUNTER — ANESTHESIA EVENT (OUTPATIENT)
Dept: GASTROENTEROLOGY | Facility: HOSPITAL | Age: 34
End: 2024-07-01

## 2024-07-01 ENCOUNTER — HOSPITAL ENCOUNTER (OUTPATIENT)
Dept: GASTROENTEROLOGY | Facility: HOSPITAL | Age: 34
Setting detail: OUTPATIENT SURGERY
Discharge: HOME | End: 2024-07-01
Attending: INTERNAL MEDICINE
Payer: COMMERCIAL

## 2024-07-01 ENCOUNTER — ANESTHESIA (OUTPATIENT)
Dept: GASTROENTEROLOGY | Facility: HOSPITAL | Age: 34
End: 2024-07-01

## 2024-07-01 VITALS
RESPIRATION RATE: 15 BRPM | SYSTOLIC BLOOD PRESSURE: 103 MMHG | DIASTOLIC BLOOD PRESSURE: 56 MMHG | TEMPERATURE: 98.2 F | HEART RATE: 55 BPM | OXYGEN SATURATION: 100 %

## 2024-07-01 DIAGNOSIS — D84.9 IMMUNOSUPPRESSED STATUS (HCC): ICD-10-CM

## 2024-07-01 DIAGNOSIS — K50.819 CROHN'S DISEASE OF SMALL AND LARGE INTESTINES WITH COMPLICATION (HCC): ICD-10-CM

## 2024-07-01 LAB
EXT PREGNANCY TEST URINE: NEGATIVE
EXT. CONTROL: NORMAL

## 2024-07-01 PROCEDURE — 45380 COLONOSCOPY AND BIOPSY: CPT | Performed by: INTERNAL MEDICINE

## 2024-07-01 PROCEDURE — 88305 TISSUE EXAM BY PATHOLOGIST: CPT | Performed by: PATHOLOGY

## 2024-07-01 PROCEDURE — 81025 URINE PREGNANCY TEST: CPT | Performed by: STUDENT IN AN ORGANIZED HEALTH CARE EDUCATION/TRAINING PROGRAM

## 2024-07-01 RX ORDER — PROPOFOL 10 MG/ML
INJECTION, EMULSION INTRAVENOUS CONTINUOUS PRN
Status: DISCONTINUED | OUTPATIENT
Start: 2024-07-01 | End: 2024-07-01

## 2024-07-01 RX ORDER — PROPOFOL 10 MG/ML
INJECTION, EMULSION INTRAVENOUS AS NEEDED
Status: DISCONTINUED | OUTPATIENT
Start: 2024-07-01 | End: 2024-07-01

## 2024-07-01 RX ORDER — LIDOCAINE HYDROCHLORIDE 10 MG/ML
0.5 INJECTION, SOLUTION EPIDURAL; INFILTRATION; INTRACAUDAL; PERINEURAL ONCE AS NEEDED
Status: DISCONTINUED | OUTPATIENT
Start: 2024-07-01 | End: 2024-07-05 | Stop reason: HOSPADM

## 2024-07-01 RX ORDER — SODIUM CHLORIDE, SODIUM LACTATE, POTASSIUM CHLORIDE, CALCIUM CHLORIDE 600; 310; 30; 20 MG/100ML; MG/100ML; MG/100ML; MG/100ML
125 INJECTION, SOLUTION INTRAVENOUS CONTINUOUS
Status: DISCONTINUED | OUTPATIENT
Start: 2024-07-01 | End: 2024-07-05 | Stop reason: HOSPADM

## 2024-07-01 RX ORDER — LIDOCAINE HYDROCHLORIDE 10 MG/ML
INJECTION, SOLUTION EPIDURAL; INFILTRATION; INTRACAUDAL; PERINEURAL AS NEEDED
Status: DISCONTINUED | OUTPATIENT
Start: 2024-07-01 | End: 2024-07-01

## 2024-07-01 RX ADMIN — SODIUM CHLORIDE, SODIUM LACTATE, POTASSIUM CHLORIDE, AND CALCIUM CHLORIDE 125 ML/HR: .6; .31; .03; .02 INJECTION, SOLUTION INTRAVENOUS at 09:32

## 2024-07-01 RX ADMIN — PROPOFOL 100 MG: 10 INJECTION, EMULSION INTRAVENOUS at 09:39

## 2024-07-01 RX ADMIN — PROPOFOL 100 MCG/KG/MIN: 10 INJECTION, EMULSION INTRAVENOUS at 09:39

## 2024-07-01 RX ADMIN — LIDOCAINE HYDROCHLORIDE 50 MG: 10 INJECTION, SOLUTION EPIDURAL; INFILTRATION; INTRACAUDAL; PERINEURAL at 09:38

## 2024-07-01 NOTE — ANESTHESIA POSTPROCEDURE EVALUATION
Post-Op Assessment Note    CV Status:  Stable  Pain Score: 0    Pain management: adequate       Mental Status:  Alert and awake   Hydration Status:  Euvolemic   PONV Controlled:  Controlled   Airway Patency:  Patent     Post Op Vitals Reviewed: Yes    No anethesia notable event occurred.    Staff: JOSE L               /54 (07/01/24 0959)    Temp (!) 97.3 °F (36.3 °C) (07/01/24 0959)    Pulse 58 (07/01/24 0959)   Resp 16 (07/01/24 0959)    SpO2 100 % (07/01/24 0959)

## 2024-07-01 NOTE — H&P
History and Physical -  [facility]enterology Specialists  Dulce Ayala 34 y.o. female MRN: [id number]                  HPI: Dulce Ayala is a 34 y.o. year old female who presents for crohn's      REVIEW OF SYSTEMS: Per the HPI, and otherwise unremarkable.    Historical Information   Past Medical History:   Diagnosis Date    Cerebral palsy (HCC)     Crohn's disease (HCC)     Intracerebral hemorrhage (HCC)     Premature birth     Seizures (HCC)     last seizure sept 2017     Past Surgical History:   Procedure Laterality Date    ACHILLES TENDON REPAIR      ruptured, length of tendon, no rupture    BRAIN SURGERY      COLONOSCOPY      EYE SURGERY      [place] COLONOSCOPY FLX DX W/COLLJ SPEC WHEN PFRMD N/A 11/16/2017    Procedure: EGD AND COLONOSCOPY;  Surgeon: Jesus Gillespie MD;  [facility];  Service: Gastroenterology    [place] COLONOSCOPY FLX DX W/COLLJ SPEC WHEN PFRMD N/A 10/10/2018    Procedure: COLONOSCOPY;  Surgeon: Jesus Gillespie MD;  [facility];  Service: Gastroenterology    TONSILLECTOMY      with adenoidectomy    UPPER GASTROINTESTINAL ENDOSCOPY      VAGAL NERVE STIMULATOR (VNS) PLACEMENT      by incision     Social History   Social History     Substance and Sexual Activity   Alcohol Use No     Social History     Substance and Sexual Activity   Drug Use No     Social History     Tobacco Use   Smoking Status Never   Smokeless Tobacco Never     Family History   Problem Relation Age of Onset    Ovarian cancer Mother        Meds/Allergies     Not in a hospital admission.    Allergies   Allergen Reactions    Gluten Meal - Food Allergy     Milk (Cow)     Wheat Bran - Food Allergy        Objective     There were no vitals taken for this visit.      PHYSICAL EXAMINATION:    General Appearance:   Alert, cooperative, no distress   HEENT:  Normocephalic, atraumatic, anicteric. Neck supple, symmetrical, trachea midline.   Lungs:   Equal chest rise and unlabored breathing, normal effort, no coughing.    Cardiovascular:   No visualized JVD.   Abdomen:   No abdominal distension.   Skin:   No jaundice, rashes, or lesions.    Musculoskeletal:   Normal range of motion visualized.   Psych:  Normal affect and normal insight.   Neuro:  Alert and appropriate.           ASSESSMENT/PLAN:  This is a 34 y.o. year old female here for colonoscopy, and she is stable and optimized for her procedure.

## 2024-07-01 NOTE — ANESTHESIA PREPROCEDURE EVALUATION
Procedure:  COLONOSCOPY    Relevant Problems   FEN/Gastrointestinal   (+) Crohn's disease of both small and large intestine (HCC)        Physical Exam    Airway    Mallampati score: I  TM Distance: >3 FB  Neck ROM: full     Dental   No notable dental hx     Cardiovascular  Rhythm: regular, Rate: normal, Cardiovascular exam normal    Pulmonary  Pulmonary exam normal Breath sounds clear to auscultation    Other Findings  post-pubertal.      Anesthesia Plan  ASA Score- 2     Anesthesia Type- IV sedation with anesthesia with ASA Monitors.         Additional Monitors:     Airway Plan:     Comment: Discussed risks/benefits, including medication reactions, awareness, aspiration, and serious/life threatening complications.    Plan to maintain native airway with IVGA, monitored with EtCO2.       Plan Factors-Exercise tolerance (METS): >4 METS.    Chart reviewed.    Patient summary reviewed.      Patient instructed to abstain from smoking on day of procedure. Patient did not smoke on day of surgery.            Induction- intravenous.    Postoperative Plan-         Informed Consent- Anesthetic plan and risks discussed with patient.  I personally reviewed this patient with the CRNA. Discussed and agreed on the Anesthesia Plan with the CRNA..

## 2024-07-06 DIAGNOSIS — D84.9 IMMUNOSUPPRESSED STATUS (HCC): ICD-10-CM

## 2024-07-06 DIAGNOSIS — M25.542 ARTHRALGIA OF BOTH HANDS: ICD-10-CM

## 2024-07-06 DIAGNOSIS — R19.5 ABNORMAL STOOLS: ICD-10-CM

## 2024-07-06 DIAGNOSIS — M25.541 ARTHRALGIA OF BOTH HANDS: ICD-10-CM

## 2024-07-06 DIAGNOSIS — K50.819 CROHN'S DISEASE OF SMALL AND LARGE INTESTINES WITH COMPLICATION (HCC): ICD-10-CM

## 2024-07-08 PROCEDURE — 88305 TISSUE EXAM BY PATHOLOGIST: CPT | Performed by: PATHOLOGY

## 2024-07-08 RX ORDER — SULFASALAZINE 500 MG/1
1000 TABLET, DELAYED RELEASE ORAL 2 TIMES DAILY
Qty: 120 TABLET | Refills: 3 | Status: SHIPPED | OUTPATIENT
Start: 2024-07-08

## 2024-09-09 ENCOUNTER — TELEPHONE (OUTPATIENT)
Dept: GASTROENTEROLOGY | Facility: AMBULARY SURGERY CENTER | Age: 34
End: 2024-09-09

## 2024-09-09 ENCOUNTER — OFFICE VISIT (OUTPATIENT)
Dept: GASTROENTEROLOGY | Facility: AMBULARY SURGERY CENTER | Age: 34
End: 2024-09-09
Payer: COMMERCIAL

## 2024-09-09 VITALS
OXYGEN SATURATION: 100 % | HEIGHT: 63 IN | WEIGHT: 148.8 LBS | SYSTOLIC BLOOD PRESSURE: 108 MMHG | BODY MASS INDEX: 26.36 KG/M2 | DIASTOLIC BLOOD PRESSURE: 64 MMHG | HEART RATE: 58 BPM

## 2024-09-09 DIAGNOSIS — M25.541 ARTHRALGIA OF BOTH HANDS: ICD-10-CM

## 2024-09-09 DIAGNOSIS — K50.819 CROHN'S DISEASE OF SMALL AND LARGE INTESTINES WITH COMPLICATION (HCC): Primary | ICD-10-CM

## 2024-09-09 DIAGNOSIS — M25.542 ARTHRALGIA OF BOTH HANDS: ICD-10-CM

## 2024-09-09 DIAGNOSIS — R55 BLACKOUT: ICD-10-CM

## 2024-09-09 DIAGNOSIS — D84.9 IMMUNOSUPPRESSED STATUS (HCC): ICD-10-CM

## 2024-09-09 PROCEDURE — 99214 OFFICE O/P EST MOD 30 MIN: CPT | Performed by: INTERNAL MEDICINE

## 2024-09-09 NOTE — PROGRESS NOTES
[facility] - Outpatient Follow-up Note  Dulce Ayala 34 y.o. female MRN: [id number]  Encounter: [id number]          ASSESSMENT AND PLAN:    Dulce Ayala is a 34 y.o. female with Crohn's disease diagnosed in 2017 with predominantly stricturing ileal disease previously treated with sulfasalazine, Entyvio and currently on Stelara but with increased symptoms over several months now presents for follow-up. Overall doing well but episodes of blackout noted.     Colonoscopy from July 2024 with benign-appearing stricture with a length of 1 cm in the terminal ileum, single small ulcer in the terminal ileum, colon appeared normal.  Biopsies with no significant acute inflammation in the terminal ileum, rare granuloma in the cecum, transverse, descending, sigmoid colon and otherwise normal biopsies.    Echocardiogram from September 2024 overall normal with EF of 61 to 65% and normal diastolic function.    CT abdomen pelvis from November 2023 with decreased prominence of previously seen moderately dilated small bowel loops but long segment of ileal disease noted.  CT abdomen pelvis from earlier 2023 with high-grade distal small bowel obstruction.    Fluoroscopic upper GI in April 2022 with extensive ileitis with luminal narrowing outpouching bowel loop separation overall similar in appearance from 2021.    CT enterography from April 2024 with multiple short segment strictures with associated mild active inflammation involving a 44 cm long segment of distal ileum involving the terminal ileum similar in appearance to November 2023 CT, 7 cm long segment of terminal ileum just proximal to the ileocecal valve that appears to be uninvolved, no evidence of gastric or large bowel inflammation, presence of nephrolithiasis noted in both kidneys.    Most recent CMP with normal electrolytes, creatinine, liver test.  Vitamin D low at 21.9 and B12 normal.  CRP normal.  Quant gold and hepatitis panel negative  as of April 2024.  Calprotectin 53.  Ustekinumab level 3.3 with no antidrug antibodies.  Most recent CBC with low hemoglobin at 11.1 and MCV low at 100 but normal white blood cell count and platelet count.    1. Crohn's disease of small and large intestines with complication (HCC)    2. Immunosuppressed status (HCC)    3. Arthralgia of both hands    4. Blackout        Orders Placed This Encounter   Procedures    US abdomen limited    CBC and differential    Comprehensive metabolic panel    C-reactive protein     Follow-up with neurology    Continue sulfasalazine 1000 mg twice daily  On turmeric.   Continue folic acid 1 mg daily  Continue Stelara every 8 weeks but monitor closely given black out episodes (unclear if related)  Continue vitamin D supplementation    Next colonoscopy 2027    Repeat labs ordered today  Repeat calprotectin  Next quant gold and hepatitis panel April 2025  Intestinal ultrasound    Future medication options include Remicade versus Humira versus Skyrizi (although might want to avoid given similarity to stelara) versus Rinvoq    Low residue diet (avoid leafy green vegetables, raw fruits and vegetables especially with skin, nuts, seeds, corn/popcorn, high-fiber foods).  Focus on significant texture modification  High-protein diet  Drink at least 8 cups of water per day    Next enterography April 2025    Follow-up with rheumatology    Avoid live virus vaccines  Yearly flu shot  COVID vaccine and booster  Pneumonia vaccine  Shingrix  Routine skin exams with the dermatologist  Routine Pap smears    ______________________________________________________________________    SUBJECTIVE:    Dulce Ayala is a 34 y.o. female who presents with complaint of Crohn's.     Occasional nocturnal stools.   1 stool per day, formed, no blood. No black stools. No pain.   No heartburn, dysphagia, nausea, vomiting.   Appetite is good and no weight loss.   Joint pains better. On turmeric and sulfasalazine.      Answers submitted by the patient for this visit:  Inflammatory Bowel Disease (Submitted on 9/3/2024)  When you are not experiencing symptoms of your inflammatory bowel disease, how many bowel movements do you typically have each day?: 0-1  What is the average (typical) number of bowel movements that you had in a single day during the last week?: 1  Since your last visit, have you received any vaccinations?: No  Since the last visit, have you had an infection?: No  Is there any possibility that you may be pregnant?: No  In the past three months, have you used tobacco in any form?: No  Have you awoken at night because you needed to move your bowels during the last month? : Yes  Have you had leakage of stool while sleeping during the last month?: No  Have you had leakage of stool while you were awake during the last month?: No  In the last 6 months, have you unintentionally lost weight?: No  Fever: No  Eye irritation: No  Mouth sores: No  Sore throat: No  Chest pain: No  Shortness of breath: No  Numbness or tingling in your hands or feet: No  Skin rash: No  Pain or swelling in your joints: No  Bruising or bleeding: No  Felt depressed or blue: No      REVIEW OF SYSTEMS IS OTHERWISE NEGATIVE.  10 point ROS reviewed and negative, except as above      Historical Information   Past Medical History:   Diagnosis Date    Cerebral palsy (HCC)     Crohn's disease (HCC)     Intracerebral hemorrhage (HCC)     Premature birth     Seizures (HCC)     last seizure sept 2017     Past Surgical History:   Procedure Laterality Date    ACHILLES TENDON REPAIR      ruptured, length of tendon, no rupture    BRAIN SURGERY      COLONOSCOPY      EYE SURGERY      ND COLONOSCOPY FLX DX W/COLLJ SPEC WHEN PFRMD N/A 11/16/2017    Procedure: EGD AND COLONOSCOPY;  Surgeon: Jesus Gillespie MD;  Location[facility];  Service: Gastroenterology    ND COLONOSCOPY FLX DX W/COLLJ SPEC WHEN PFRMD N/A 10/10/2018    Procedure: COLONOSCOPY;  Surgeon: Jesus  "MD Verna;  Location: [facility];  Service: Gastroenterology    TONSILLECTOMY      with adenoidectomy    UPPER GASTROINTESTINAL ENDOSCOPY      VAGAL NERVE STIMULATOR (VNS) PLACEMENT      by incision     Social History   Social History     Substance and Sexual Activity   Alcohol Use No     Social History     Substance and Sexual Activity   Drug Use No     Social History     Tobacco Use   Smoking Status Never   Smokeless Tobacco Never     Family History   Problem Relation Age of Onset    Ovarian cancer Mother        Meds/Allergies       Current Outpatient Medications:     ASHWAGANDHA PO    Biotin 5000 MCG CAPS    Cholecalciferol 1.25 MG (17458 UT) capsule    folic acid ( Folic Acid) 1 mg tablet    Multiple Vitamin (Multivitamin Adult) TABS    Stelara 90 MG/ML subcutaneous injection    sulfaSALAzine (AZULFIDINE-EN) 500 mg EC tablet    Allergies   Allergen Reactions    Gluten Meal - Food Allergy     Milk (Cow)     Wheat Bran - Food Allergy            Objective     Blood pressure 108/64, pulse 58, height 5' 3\" (1.6 m), weight 67.5 kg (148 lb 12.8 oz), SpO2 100%. Body mass index is 26.36 kg/m².    PHYSICAL EXAMINATION:    General Appearance:   Alert, cooperative, no distress   HEENT:  Normocephalic, atraumatic, anicteric. Neck supple, symmetrical, trachea midline.   Lungs:   Equal chest rise and unlabored breathing, normal effort, no coughing.   Cardiovascular:   No visualized JVD.   Abdomen:   No abdominal distension.   Skin:   No jaundice, rashes, or lesions.    Musculoskeletal:   Normal range of motion visualized.   Psych:  Normal affect and normal insight.   Neuro:  Alert and appropriate.         Lab Results:   No visits with results within 1 Day(s) from this visit.   Latest known visit with results is:   Hospital Outpatient Visit on 07/01/2024   Component Date Value    EXT Preg Test, Ur 07/01/2024 Negative     Control 07/01/2024 Valid     Case Report 07/01/2024                      Value:Surgical Pathology Report "                         Case: [id number]                                  Authorizing Provider:  Nicolas Chaney MD    Collected:           07/01/2024 0946              Ordering Location:     [facility] Received:            07/01/2024 1649                                     Heart Endoscopy                                                              Pathologist:           Alysia Hopkins MD                                                             Specimens:   A) - Terminal Ileum, bx, hx of crohns                                                               B) - Large Intestine, Cecum, bx, hx of crohns                                                       C) - Colon, bx, ascending hx of crohns                                                              D) - Colon, bx, transverse hx of crohns                                                             E) - Colon, bx, descending hx of crohns                                                                                       F) - Colon, bx, sigmoid hx of crohns                                                                G) - Colon, bx, rectum hx of crohns                                                        Final Diagnosis 07/01/2024                      Value:A. Terminal Ileum, bx, hx of crohns:  - Small bowel mucosa, no significant acute inflammation seen. See note.    B. Large Intestine, Cecum, bx, hx of crohns:  - Colonic mucosa  - No evidence of acute colitis seen  - Rare granuloma seen     C. Colon, bx, ascending hx of crohns:  - Unremarkable colonic mucosa  - No evidence of acute and chronic colitis seen    D. Colon, bx, transverse hx of crohns:  - Colonic mucosa  - No evidence of acute colitis seen  - Rare granuloma seen     E. Colon, bx, descending hx of crohns:  - Colonic mucosa  - No evidence of acute colitis seen  - Rare granuloma seen     F. Colon, bx, sigmoid hx of crohns:  - Colonic mucosa  - No evidence of acute colitis  "seen  - Rare granuloma seen     G. Colon, bx, rectum hx of crohns:  - Unremarkable colonic mucosa  - No evidence of acute and chronic colitis seen             Note 07/01/2024                      Value:NOTE A to G: No dysplasia is seen.      Additional Information 07/01/2024                      Value:All reported additional testing was performed with appropriately reactive controls.  These tests were developed and their performance characteristics determined by [facility] or appropriate performing facility, though some tests may be performed on tissues which have not been validated for performance characteristics (such as staining performed on alcohol exposed cell blocks and decalcified tissues).  Results should be interpreted with caution and in the context of the patients’ clinical condition. These tests may not be cleared or approved by the U.S. Food and Drug Administration, though the FDA has determined that such clearance or approval is not necessary. These tests are used for clinical purposes and they should not be regarded as investigational or for research. This laboratory has been approved by [facility], designated as a high-complexity laboratory and is qualified to perform these tests.  .  Interpretation performed at [facility], [facility] [facility] [address]         Gross Description 07/01/2024                      Value:A. The specimen is received in formalin, labeled with the patient's name and hospital number, and is designated \" terminal ileum biopsy, Hx of Crohn's.\"  It consists of 2 tan soft tissue fragments, measuring 0.2 and 0.5 cm in greatest dimension.  The specimen is submitted in toto in 1 screened cassette.  B. The specimen is received in formalin, labeled with the patient's name and hospital number, and is designated \" large intestine, cecum biopsy, Hx of Crohn's.\"  It consists of 3 tan soft tissue fragments, measuring 0.2-0.6 cm in " "greatest dimension.  The specimen is submitted in toto in 1 screened cassette.  C. The specimen is received in formalin, labeled with the patient's name and hospital number, and is designated \" colon biopsy, ascending Hx of Crohn's.\"  It consists of 3 tan soft tissue fragments, measuring 0.2-0.5 cm in greatest dimension.  The specimen is submitted in toto in 1 screened cassette.  D. The specimen is received in formalin, labeled with the patient's name and                           hospital number, and is designated \" colon biopsy, transverse Hx of Crohn's.\"  It consists of 5 tan soft tissue fragments, measuring 0.1-0.8 cm in greatest dimension.  The specimen is submitted in toto in 1 screened cassette.  E. The specimen is received in formalin, labeled with the patient's name and hospital number, and is designated \" colon biopsy, descending Hx of Crohn's.\"  It consists of 3 tan soft tissue fragments, measuring 0.2-0.6 cm in greatest dimension.  The specimen is submitted in toto in 1 screened cassette.  F. The specimen is received in formalin, labeled with the patient's name and hospital number, and is designated \" colon biopsy, sigmoid Hx of Crohn's.\"  It consists of 3 tan soft tissue fragments, measuring 0.4-0.6 cm in greatest dimension.  The specimen is submitted in toto in 1 screened cassette.  G. The specimen is received in formalin, labeled with the patient's name and hospital number, and is designated \" colon biopsy, rectum Hx of Crohn's.\"  It consists of 2                           tan soft tissue fragments, measuring 0.4 and 0.5 cm in greatest dimension.  The specimen is submitted in toto in 1 screened cassette.    Note: The estimated total formalin fixation time based upon information provided by the submitting clinician and the standard processing schedule is under 72 hours.    MScheib         Lab Results   Component Value Date    WBC 8.27 12/28/2023    HGB 11.1 (L) 12/28/2023    HCT 35.4 12/28/2023    MCV " 100 (H) 12/28/2023     12/28/2023       Lab Results   Component Value Date     03/02/2015    SODIUM 135 04/09/2024    K 3.8 04/09/2024     04/09/2024    CO2 28 04/09/2024    ANIONGAP 7 03/02/2015    AGAP 6 04/09/2024    BUN 13 04/09/2024    CREATININE 0.81 04/09/2024    GLUC 107 (H) 11/20/2023    GLUF 83 04/09/2024    CALCIUM 9.2 04/09/2024    AST 16 04/09/2024    ALT 11 04/09/2024    ALKPHOS 48 04/09/2024    PROT 7.3 03/02/2015    TP 6.6 04/09/2024    BILITOT 0.2 03/02/2015    TBILI 0.42 04/09/2024    EGFR 97 04/09/2024       Lab Results   Component Value Date    CRP 1.2 04/09/2024       Lab Results   Component Value Date    KLA0LSGCRBQJ 1.410 09/12/2017    TSH 0.78 10/13/2020       Lab Results   Component Value Date    IRON 83 07/06/2023    TIBC 319 07/06/2023    FERRITIN 29 06/11/2022       Radiology Results:   ECHO 2D COMPLETE    Result Date: 9/4/2024  Narrative: This result has an attachment that is not available.   Normal echocardiogram. Left Ventricle Left ventricle is normal in size. Wall thickness is normal. Systolic function is normal with an ejection fraction of 61-65%. Wall motion is within normal limits. There is normal diastolic function. Right Ventricle Right ventricle cavity is normal. Systolic function is normal. Left Atrium Left atrium cavity size is normal. Right Atrium Right atrium cavity is normal. IVC/SVC The inferior vena cava demonstrates a diameter of <=21 mm and collapses >50%; therefore, the right atrial pressure is estimated at 0-5 mmHg. Mitral Valve Mitral valve structure is normal. There is no regurgitation or stenosis. Tricuspid Valve Tricuspid valve structure is normal. There is trace regurgitation. There is no evidence of tricuspid valve stenosis. Aortic Valve The aortic valve is trileaflet. There is no regurgitation or stenosis. Pulmonic Valve The pulmonic valve was not well visualized. There is no regurgitation or stenosis. The estimated pulmonary artery systolic  pressure is 20.3 mmHg. Ascending Aorta The aorta appears normal in size. Pericardium There is no pericardial effusion. Study Details A complete 2D echocardiogram was performed. Color flow, Pulse Wave and Continuous Wave Doppler was performed and analyzed.Overall the study quality was good.

## 2024-09-09 NOTE — TELEPHONE ENCOUNTER
Patient scheduled 9/17 at 930. Fasting 1 hour, patient aware of location, and which part of the campus to go to.

## 2024-09-13 DIAGNOSIS — R19.5 ABNORMAL STOOLS: ICD-10-CM

## 2024-09-13 DIAGNOSIS — D84.9 IMMUNOSUPPRESSED STATUS (HCC): ICD-10-CM

## 2024-09-13 DIAGNOSIS — M25.542 ARTHRALGIA OF BOTH HANDS: ICD-10-CM

## 2024-09-13 DIAGNOSIS — M25.541 ARTHRALGIA OF BOTH HANDS: ICD-10-CM

## 2024-09-13 DIAGNOSIS — K50.819 CROHN'S DISEASE OF SMALL AND LARGE INTESTINES WITH COMPLICATION (HCC): ICD-10-CM

## 2024-09-13 RX ORDER — FOLIC ACID 1 MG/1
1000 TABLET ORAL DAILY
Qty: 30 TABLET | Refills: 5 | Status: SHIPPED | OUTPATIENT
Start: 2024-09-13

## 2024-09-13 RX ORDER — SULFASALAZINE 500 MG/1
1000 TABLET, DELAYED RELEASE ORAL 2 TIMES DAILY
Qty: 120 TABLET | Refills: 3 | Status: SHIPPED | OUTPATIENT
Start: 2024-09-13

## 2024-09-17 ENCOUNTER — HOSPITAL ENCOUNTER (OUTPATIENT)
Dept: ULTRASOUND IMAGING | Facility: HOSPITAL | Age: 34
Discharge: HOME | End: 2024-09-17
Payer: COMMERCIAL

## 2024-09-17 DIAGNOSIS — R55 BLACKOUT: ICD-10-CM

## 2024-09-17 DIAGNOSIS — K50.819 CROHN'S DISEASE OF SMALL AND LARGE INTESTINES WITH COMPLICATION (HCC): ICD-10-CM

## 2024-09-17 DIAGNOSIS — M25.541 ARTHRALGIA OF BOTH HANDS: ICD-10-CM

## 2024-09-17 DIAGNOSIS — D84.9 IMMUNOSUPPRESSED STATUS (HCC): ICD-10-CM

## 2024-09-17 DIAGNOSIS — M25.542 ARTHRALGIA OF BOTH HANDS: ICD-10-CM

## 2024-09-17 PROCEDURE — 76705 ECHO EXAM OF ABDOMEN: CPT

## 2024-10-08 DIAGNOSIS — K50.819 CROHN'S DISEASE OF SMALL AND LARGE INTESTINES WITH COMPLICATION (HCC): ICD-10-CM

## 2024-10-08 DIAGNOSIS — R19.5 ABNORMAL STOOLS: ICD-10-CM

## 2024-10-08 DIAGNOSIS — M25.542 ARTHRALGIA OF BOTH HANDS: ICD-10-CM

## 2024-10-08 DIAGNOSIS — M25.541 ARTHRALGIA OF BOTH HANDS: ICD-10-CM

## 2024-10-08 DIAGNOSIS — D84.9 IMMUNOSUPPRESSED STATUS (HCC): ICD-10-CM

## 2024-10-08 RX ORDER — SULFASALAZINE 500 MG/1
1000 TABLET, DELAYED RELEASE ORAL 2 TIMES DAILY
Qty: 120 TABLET | Refills: 3 | Status: SHIPPED | OUTPATIENT
Start: 2024-10-08

## 2024-11-29 ENCOUNTER — RESULTS FOLLOW-UP (OUTPATIENT)
Age: 34
End: 2024-11-29

## 2024-11-29 LAB
ALBUMIN SERPL-MCNC: 4.2 G/DL (ref 3.5–5.7)
ALP SERPL-CCNC: 52 U/L (ref 35–120)
ALT SERPL-CCNC: 9 U/L
ANION GAP SERPL CALCULATED.3IONS-SCNC: 8 MMOL/L (ref 3–11)
AST SERPL-CCNC: 18 U/L
BASOPHILS # BLD AUTO: 0 THOU/CMM (ref 0–0.1)
BASOPHILS NFR BLD AUTO: 1 %
BILIRUB SERPL-MCNC: 0.3 MG/DL (ref 0.2–1)
BUN SERPL-MCNC: 9 MG/DL (ref 7–25)
CALCIUM SERPL-MCNC: 9.3 MG/DL (ref 8.5–10.1)
CHLORIDE SERPL-SCNC: 103 MMOL/L (ref 100–109)
CO2 SERPL-SCNC: 29 MMOL/L (ref 21–31)
CREAT SERPL-MCNC: 0.8 MG/DL (ref 0.4–1.1)
CRP SERPL-MCNC: 3 MG/L
CYTOLOGY CMNT CVX/VAG CYTO-IMP: NORMAL
DIFFERENTIAL METHOD BLD: ABNORMAL
EOSINOPHIL # BLD AUTO: 0.1 THOU/CMM (ref 0–0.5)
EOSINOPHIL NFR BLD AUTO: 3 %
ERYTHROCYTE [DISTWIDTH] IN BLOOD BY AUTOMATED COUNT: 14.4 % (ref 12–16)
GFR/BSA.PRED SERPLBLD CYS-BASED-ARV: 100 ML/MIN/{1.73_M2}
GLUCOSE SERPL-MCNC: 77 MG/DL (ref 65–99)
HCT VFR BLD AUTO: 35.6 % (ref 35–43)
HGB BLD-MCNC: 11.9 G/DL (ref 11.5–14.5)
LYMPHOCYTES # BLD AUTO: 0.5 THOU/CMM (ref 1–3)
LYMPHOCYTES NFR BLD AUTO: 15 %
MCH RBC QN AUTO: 30.3 PG (ref 26–34)
MCHC RBC AUTO-ENTMCNC: 33.4 G/DL (ref 32–37)
MCV RBC AUTO: 91 FL (ref 80–100)
MONOCYTES # BLD AUTO: 0.5 THOU/CMM (ref 0.3–1)
MONOCYTES NFR BLD AUTO: 15 %
NEUTROPHILS # BLD AUTO: 2.3 THOU/CMM (ref 1.8–7.8)
NEUTROPHILS NFR BLD AUTO: 66 %
PLATELET # BLD AUTO: 194 THOU/CMM (ref 140–350)
PMV BLD REES-ECKER: 7.3 FL (ref 7.5–11.3)
POTASSIUM SERPL-SCNC: 4.2 MMOL/L (ref 3.5–5.2)
PROT SERPL-MCNC: 6.5 G/DL (ref 6.3–8.3)
RBC # BLD AUTO: 3.93 MILL/CMM (ref 3.7–4.7)
SODIUM SERPL-SCNC: 140 MMOL/L (ref 135–145)
WBC # BLD AUTO: 3.5 THOU/CMM (ref 4–10)

## 2025-01-24 DIAGNOSIS — K50.819 CROHN'S DISEASE OF SMALL AND LARGE INTESTINES WITH COMPLICATION (HCC): ICD-10-CM

## 2025-01-24 DIAGNOSIS — D84.9 IMMUNOSUPPRESSED STATUS (HCC): ICD-10-CM

## 2025-01-24 RX ORDER — USTEKINUMAB 90 MG/ML
INJECTION, SOLUTION SUBCUTANEOUS
Qty: 1 ML | Refills: 1 | Status: SHIPPED | OUTPATIENT
Start: 2025-01-24

## 2025-01-24 NOTE — TELEPHONE ENCOUNTER
Reason for call:   [x] Refill   [] Prior Auth  [] Other:     Office:   [] PCP/Provider -   [x] Specialty/Provider - Nicolas Chaney     Medication: Stelara 90 MG/ML subcutaneous injection     Dose/Frequency: Inject 90mg under the skin every 42 days,     Quantity: 1ml    Pharmacy: [facility]    Does the patient have enough for 3 days? unknown  [] Yes   [] No - Send as HP to POD

## 2025-02-28 DIAGNOSIS — K50.819 CROHN'S DISEASE OF SMALL AND LARGE INTESTINES WITH COMPLICATION (HCC): ICD-10-CM

## 2025-02-28 DIAGNOSIS — D84.9 IMMUNOSUPPRESSED STATUS (HCC): ICD-10-CM

## 2025-02-28 DIAGNOSIS — M25.541 ARTHRALGIA OF BOTH HANDS: ICD-10-CM

## 2025-02-28 DIAGNOSIS — M25.542 ARTHRALGIA OF BOTH HANDS: ICD-10-CM

## 2025-02-28 DIAGNOSIS — R19.5 ABNORMAL STOOLS: ICD-10-CM

## 2025-02-28 RX ORDER — FOLIC ACID 1 MG/1
1000 TABLET ORAL DAILY
Qty: 90 TABLET | Refills: 1 | Status: SHIPPED | OUTPATIENT
Start: 2025-02-28

## 2025-03-19 ENCOUNTER — TELEPHONE (OUTPATIENT)
Age: 35
End: 2025-03-19

## 2025-03-19 NOTE — TELEPHONE ENCOUNTER
Medication: Stelara 90mg syringe  Directions: inject 90mg every 6 weeks  Quantity: 1ml  Day Supply:42  Insurance: [employer]  How Prior Auth was submitted: Nicky  Authorization Date range: 3/19/2025 - 3/19/2026  Authorization Number: letter in Media  Pharmacy that fills med: [facility]

## 2025-04-03 DIAGNOSIS — K50.819 CROHN'S DISEASE OF SMALL AND LARGE INTESTINES WITH COMPLICATION (HCC): ICD-10-CM

## 2025-04-03 DIAGNOSIS — D84.9 IMMUNOSUPPRESSED STATUS (HCC): ICD-10-CM

## 2025-04-03 RX ORDER — USTEKINUMAB 90 MG/ML
INJECTION, SOLUTION SUBCUTANEOUS
Qty: 1 ML | Refills: 1 | Status: SHIPPED | OUTPATIENT
Start: 2025-04-03

## 2025-04-03 NOTE — TELEPHONE ENCOUNTER
[facility] states that they need a new script. Last filled with them on 01/23/25    Reason for call:   [x] Refill   [] Prior Auth  [] Other:     Office:   [] PCP/Provider -   [x] Specialty/Provider - GASTRO SPCLST SANDRA  Authorized By: Nicolas Chaney MD      Medication: Stelara 90 MG/ML subcutaneous injection    Dose/Frequency:  Inject 90mg under the skin every 42 days    Quantity:  1 mL    [facility] [address]     Local Pharmacy   Does the patient have enough for 3 days?   [] Yes   [] No - Send as HP to POD    Mail Away Pharmacy   Does the patient have enough for 10 days?   [] Yes   [x] No - Send as HP to POD

## 2025-04-25 ENCOUNTER — TELEPHONE (OUTPATIENT)
Age: 35
End: 2025-04-25

## 2025-04-25 NOTE — TELEPHONE ENCOUNTER
Patients GI provider:  Dr. Chaney    Number to return call: [facility] [phone] # [phone]    Reason for call: Deepali called from [facility] called stated it was very hard to get the pt to talk finally she got her and spoken with her. Deepali Stated she is discontinuing on Stelara for the Pt as she refused to have the delivery of the med Stelara and refused to pay on that, pt said she will change to the different dr and with different med she denied to have it. Any further questions please contact Deepali     Scheduled procedure/appointment date if applicable: none

## 2025-04-25 NOTE — TELEPHONE ENCOUNTER
See telephone call from 4/25. Provider information provided via [employer] if pt decides to continue with [facility].

## 2025-05-13 DIAGNOSIS — M25.542 ARTHRALGIA OF BOTH HANDS: ICD-10-CM

## 2025-05-13 DIAGNOSIS — K50.819 CROHN'S DISEASE OF SMALL AND LARGE INTESTINES WITH COMPLICATION (HCC): ICD-10-CM

## 2025-05-13 DIAGNOSIS — D84.9 IMMUNOSUPPRESSED STATUS (HCC): ICD-10-CM

## 2025-05-13 DIAGNOSIS — M25.541 ARTHRALGIA OF BOTH HANDS: ICD-10-CM

## 2025-05-13 DIAGNOSIS — R19.5 ABNORMAL STOOLS: ICD-10-CM

## 2025-05-13 RX ORDER — SULFASALAZINE 500 MG/1
1000 TABLET, DELAYED RELEASE ORAL 2 TIMES DAILY
Qty: 120 TABLET | Refills: 3 | Status: SHIPPED | OUTPATIENT
Start: 2025-05-13